# Patient Record
Sex: MALE | Race: WHITE | Employment: UNEMPLOYED | URBAN - METROPOLITAN AREA
[De-identification: names, ages, dates, MRNs, and addresses within clinical notes are randomized per-mention and may not be internally consistent; named-entity substitution may affect disease eponyms.]

---

## 2017-04-26 ENCOUNTER — HOSPITAL ENCOUNTER (EMERGENCY)
Age: 42
Discharge: HOME | End: 2017-04-26
Attending: EMERGENCY MEDICINE
Payer: MEDICAID

## 2017-04-26 ENCOUNTER — APPOINTMENT (OUTPATIENT)
Dept: CT IMAGING | Age: 42
End: 2017-04-26
Payer: MEDICAID

## 2017-04-26 ENCOUNTER — APPOINTMENT (OUTPATIENT)
Dept: GENERAL RADIOLOGY | Age: 42
End: 2017-04-26
Payer: MEDICAID

## 2017-04-26 VITALS
HEART RATE: 63 BPM | HEIGHT: 69 IN | SYSTOLIC BLOOD PRESSURE: 142 MMHG | TEMPERATURE: 98.2 F | OXYGEN SATURATION: 98 % | RESPIRATION RATE: 18 BRPM | DIASTOLIC BLOOD PRESSURE: 95 MMHG | BODY MASS INDEX: 28.14 KG/M2 | WEIGHT: 190 LBS

## 2017-04-26 DIAGNOSIS — S09.90XA HEAD INJURY, INITIAL ENCOUNTER: Primary | ICD-10-CM

## 2017-04-26 DIAGNOSIS — S16.1XXA NECK STRAIN, INITIAL ENCOUNTER: ICD-10-CM

## 2017-04-26 DIAGNOSIS — S39.012A LUMBAR STRAIN, INITIAL ENCOUNTER: ICD-10-CM

## 2017-04-26 PROCEDURE — 70450 CT HEAD/BRAIN W/O DYE: CPT

## 2017-04-26 PROCEDURE — 99284 EMERGENCY DEPT VISIT MOD MDM: CPT

## 2017-04-26 PROCEDURE — 72050 X-RAY EXAM NECK SPINE 4/5VWS: CPT

## 2017-04-26 RX ORDER — HYDROCODONE BITARTRATE AND ACETAMINOPHEN 5; 325 MG/1; MG/1
1 TABLET ORAL EVERY 6 HOURS PRN
Qty: 10 TABLET | Refills: 0 | Status: SHIPPED | OUTPATIENT
Start: 2017-04-26 | End: 2017-05-03

## 2017-04-26 RX ORDER — NAPROXEN 500 MG/1
500 TABLET ORAL 2 TIMES DAILY WITH MEALS
Qty: 30 TABLET | Refills: 0 | Status: SHIPPED | OUTPATIENT
Start: 2017-04-26 | End: 2019-02-02 | Stop reason: ALTCHOICE

## 2017-04-26 RX ORDER — CYCLOBENZAPRINE HCL 10 MG
10 TABLET ORAL 3 TIMES DAILY PRN
Qty: 30 TABLET | Refills: 0 | Status: SHIPPED | OUTPATIENT
Start: 2017-04-26 | End: 2017-05-06

## 2017-04-26 ASSESSMENT — ENCOUNTER SYMPTOMS
CONSTIPATION: 0
STRIDOR: 0
WHEEZING: 0
EYE DISCHARGE: 0
SORE THROAT: 0
BACK PAIN: 1
ABDOMINAL PAIN: 0
SHORTNESS OF BREATH: 0
DIARRHEA: 0
COUGH: 0
RHINORRHEA: 0
ABDOMINAL DISTENTION: 0
CHEST TIGHTNESS: 0
VOMITING: 0
CHOKING: 0
NAUSEA: 0
COLOR CHANGE: 0

## 2017-04-26 ASSESSMENT — PAIN SCALES - GENERAL: PAINLEVEL_OUTOF10: 8

## 2017-04-26 ASSESSMENT — PAIN DESCRIPTION - LOCATION: LOCATION: BACK;HEAD;NECK

## 2019-02-02 ENCOUNTER — APPOINTMENT (OUTPATIENT)
Dept: GENERAL RADIOLOGY | Age: 44
End: 2019-02-02
Payer: COMMERCIAL

## 2019-02-02 ENCOUNTER — HOSPITAL ENCOUNTER (EMERGENCY)
Age: 44
Discharge: HOME | End: 2019-02-02
Payer: COMMERCIAL

## 2019-02-02 VITALS
OXYGEN SATURATION: 96 % | HEART RATE: 73 BPM | RESPIRATION RATE: 18 BRPM | TEMPERATURE: 97.6 F | HEIGHT: 69 IN | BODY MASS INDEX: 25.92 KG/M2 | WEIGHT: 175 LBS | DIASTOLIC BLOOD PRESSURE: 80 MMHG | SYSTOLIC BLOOD PRESSURE: 125 MMHG

## 2019-02-02 DIAGNOSIS — S40.011A CONTUSION OF RIGHT SHOULDER, INITIAL ENCOUNTER: Primary | ICD-10-CM

## 2019-02-02 PROCEDURE — 6370000000 HC RX 637 (ALT 250 FOR IP): Performed by: NURSE PRACTITIONER

## 2019-02-02 PROCEDURE — 73030 X-RAY EXAM OF SHOULDER: CPT

## 2019-02-02 PROCEDURE — 99283 EMERGENCY DEPT VISIT LOW MDM: CPT

## 2019-02-02 RX ORDER — HYDROCODONE BITARTRATE AND ACETAMINOPHEN 5; 325 MG/1; MG/1
1 TABLET ORAL ONCE
Status: COMPLETED | OUTPATIENT
Start: 2019-02-02 | End: 2019-02-02

## 2019-02-02 RX ORDER — CYCLOBENZAPRINE HCL 10 MG
10 TABLET ORAL 3 TIMES DAILY PRN
Qty: 15 TABLET | Refills: 0 | Status: SHIPPED | OUTPATIENT
Start: 2019-02-02 | End: 2019-02-12

## 2019-02-02 RX ORDER — NAPROXEN 500 MG/1
500 TABLET ORAL 2 TIMES DAILY
Qty: 20 TABLET | Refills: 0 | Status: SHIPPED | OUTPATIENT
Start: 2019-02-02 | End: 2019-07-22

## 2019-02-02 RX ADMIN — HYDROCODONE BITARTRATE AND ACETAMINOPHEN 1 TABLET: 5; 325 TABLET ORAL at 17:30

## 2019-02-02 ASSESSMENT — ENCOUNTER SYMPTOMS
ABDOMINAL PAIN: 0
SHORTNESS OF BREATH: 0
BACK PAIN: 0
COUGH: 0

## 2019-02-02 ASSESSMENT — PAIN SCALES - GENERAL
PAINLEVEL_OUTOF10: 7
PAINLEVEL_OUTOF10: 7

## 2019-02-02 ASSESSMENT — PAIN DESCRIPTION - LOCATION: LOCATION: SHOULDER

## 2019-02-02 ASSESSMENT — PAIN DESCRIPTION - ORIENTATION: ORIENTATION: RIGHT

## 2019-07-22 ENCOUNTER — HOSPITAL ENCOUNTER (EMERGENCY)
Age: 44
Discharge: HOME | End: 2019-07-22
Payer: COMMERCIAL

## 2019-07-22 ENCOUNTER — APPOINTMENT (OUTPATIENT)
Dept: GENERAL RADIOLOGY | Age: 44
End: 2019-07-22
Payer: COMMERCIAL

## 2019-07-22 VITALS
RESPIRATION RATE: 16 BRPM | OXYGEN SATURATION: 97 % | HEART RATE: 86 BPM | TEMPERATURE: 98.1 F | SYSTOLIC BLOOD PRESSURE: 120 MMHG | HEIGHT: 69 IN | DIASTOLIC BLOOD PRESSURE: 83 MMHG | WEIGHT: 180 LBS | BODY MASS INDEX: 26.66 KG/M2

## 2019-07-22 DIAGNOSIS — S93.401A SPRAIN OF RIGHT ANKLE, UNSPECIFIED LIGAMENT, INITIAL ENCOUNTER: Primary | ICD-10-CM

## 2019-07-22 PROCEDURE — 73610 X-RAY EXAM OF ANKLE: CPT

## 2019-07-22 PROCEDURE — 99283 EMERGENCY DEPT VISIT LOW MDM: CPT

## 2019-07-22 PROCEDURE — 6370000000 HC RX 637 (ALT 250 FOR IP): Performed by: PHYSICIAN ASSISTANT

## 2019-07-22 RX ORDER — IBUPROFEN 800 MG/1
800 TABLET ORAL ONCE
Status: COMPLETED | OUTPATIENT
Start: 2019-07-22 | End: 2019-07-22

## 2019-07-22 RX ORDER — IBUPROFEN 800 MG/1
800 TABLET ORAL EVERY 8 HOURS PRN
Qty: 20 TABLET | Refills: 0 | Status: SHIPPED | OUTPATIENT
Start: 2019-07-22 | End: 2020-08-03

## 2019-07-22 RX ADMIN — IBUPROFEN 800 MG: 800 TABLET, FILM COATED ORAL at 21:44

## 2019-07-22 ASSESSMENT — ENCOUNTER SYMPTOMS
BACK PAIN: 0
VOICE CHANGE: 0
ABDOMINAL DISTENTION: 0
VOMITING: 0
PHOTOPHOBIA: 0
EYE DISCHARGE: 0
ANAL BLEEDING: 0
NAUSEA: 0
APNEA: 0

## 2019-07-22 ASSESSMENT — PAIN DESCRIPTION - LOCATION: LOCATION: ANKLE

## 2019-07-22 ASSESSMENT — PAIN DESCRIPTION - ORIENTATION: ORIENTATION: RIGHT

## 2019-07-22 ASSESSMENT — PAIN SCALES - GENERAL
PAINLEVEL_OUTOF10: 10
PAINLEVEL_OUTOF10: 10

## 2019-07-22 ASSESSMENT — PAIN DESCRIPTION - PAIN TYPE: TYPE: ACUTE PAIN

## 2019-07-22 ASSESSMENT — PAIN DESCRIPTION - FREQUENCY: FREQUENCY: CONTINUOUS

## 2019-07-22 ASSESSMENT — PAIN DESCRIPTION - DESCRIPTORS: DESCRIPTORS: THROBBING

## 2019-07-23 NOTE — ED PROVIDER NOTES
120/83   Pulse: 86   Resp: 16   Temp: 98.1 °F (36.7 °C)   TempSrc: Oral   SpO2: 97%   Weight: 180 lb (81.6 kg)   Height: 5' 9\" (1.753 m)            MDM  Number of Diagnoses or Management Options  Sprain of right ankle, unspecified ligament, initial encounter:   Diagnosis management comments: Patient remain nonweightbearing follow-up with orthopedics tomorrow. Return to if any symptoms worsen or new symptoms develop. Amount and/or Complexity of Data Reviewed  Tests in the radiology section of CPT®: reviewed and ordered        CRITICAL CARE TIME   Total Critical Care time was   minutes, excluding separately reportableprocedures. There was a high probability of clinicallysignificant/life threatening deterioration in the patient's condition which required my urgent intervention. CONSULTS:  None    PROCEDURES:  Unless otherwise noted below, none     Procedures    FINAL IMPRESSION      1.  Sprain of right ankle, unspecified ligament, initial encounter          DISPOSITION/PLAN   DISPOSITION Decision To Discharge 07/22/2019 09:34:32 PM      PATIENT REFERRED TO:  Matt Kimball MD  [address] [place] [address] [facility]  [phone]    Schedule an appointment as soon as possible for a visit in 1 day      [facility]) ED  [address] [facility]  [phone]    If symptoms worsen      DISCHARGE MEDICATIONS:  New Prescriptions    IBUPROFEN (IBU) 800 MG TABLET    Take 1 tablet by mouth every 8 hours as needed for Pain          (Please note that portions of this note were completed with a voice recognition program.  Efforts were made to edit the dictations but occasionally words are mis-transcribed.)    Sara Groves PA-C (electronically signed)  Attending Emergency Physician       Sara Groves PA-C  07/22/19 2138       Sara Groves PA-C  09/11/19 [address] Elsa Rodriguez PA-C  09/25/19 [id number] [facility]  09/30/19 [id number]

## 2019-09-11 ASSESSMENT — ENCOUNTER SYMPTOMS
ABDOMINAL DISTENTION: 0
NAUSEA: 0
VOMITING: 0
VOICE CHANGE: 0
ANAL BLEEDING: 0
APNEA: 0
PHOTOPHOBIA: 0
EYE DISCHARGE: 0
BACK PAIN: 0

## 2020-08-03 ENCOUNTER — HOSPITAL ENCOUNTER (INPATIENT)
Age: 45
LOS: 2 days | Discharge: HOME | DRG: 316 | End: 2020-08-05
Attending: EMERGENCY MEDICINE | Admitting: ORTHOPAEDIC SURGERY
Payer: COMMERCIAL

## 2020-08-03 ENCOUNTER — APPOINTMENT (OUTPATIENT)
Dept: GENERAL RADIOLOGY | Age: 45
DRG: 316 | End: 2020-08-03
Payer: COMMERCIAL

## 2020-08-03 PROBLEM — M65.9 TENOSYNOVITIS OF FINGER: Status: ACTIVE | Noted: 2020-08-03

## 2020-08-03 LAB
ALBUMIN SERPL-MCNC: 4.1 G/DL (ref 3.5–4.6)
ALP BLD-CCNC: 76 U/L (ref 35–104)
ALT SERPL-CCNC: 20 U/L (ref 0–41)
ANION GAP SERPL CALCULATED.3IONS-SCNC: 14 MEQ/L (ref 9–15)
APTT: 29.5 SEC (ref 24.4–36.8)
AST SERPL-CCNC: 18 U/L (ref 0–40)
BASOPHILS ABSOLUTE: 0.1 K/UL (ref 0–0.2)
BASOPHILS RELATIVE PERCENT: 0.5 %
BILIRUB SERPL-MCNC: 0.7 MG/DL (ref 0.2–0.7)
BUN BLDV-MCNC: 7 MG/DL (ref 6–20)
CALCIUM SERPL-MCNC: 9 MG/DL (ref 8.5–9.9)
CHLORIDE BLD-SCNC: 101 MEQ/L (ref 95–107)
CO2: 25 MEQ/L (ref 20–31)
CREAT SERPL-MCNC: 0.82 MG/DL (ref 0.7–1.2)
EOSINOPHILS ABSOLUTE: 0.2 K/UL (ref 0–0.7)
EOSINOPHILS RELATIVE PERCENT: 1.3 %
GFR AFRICAN AMERICAN: >60
GFR NON-AFRICAN AMERICAN: >60
GLOBULIN: 2.9 G/DL (ref 2.3–3.5)
GLUCOSE BLD-MCNC: 119 MG/DL (ref 70–99)
HCT VFR BLD CALC: 48.2 % (ref 42–52)
HEMOGLOBIN: 16.3 G/DL (ref 14–18)
INR BLD: 1
LYMPHOCYTES ABSOLUTE: 1.2 K/UL (ref 1–4.8)
LYMPHOCYTES RELATIVE PERCENT: 9.9 %
MCH RBC QN AUTO: 32.3 PG (ref 27–31.3)
MCHC RBC AUTO-ENTMCNC: 33.8 % (ref 33–37)
MCV RBC AUTO: 95.7 FL (ref 80–100)
MONOCYTES ABSOLUTE: 0.9 K/UL (ref 0.2–0.8)
MONOCYTES RELATIVE PERCENT: 8.1 %
NEUTROPHILS ABSOLUTE: 9.4 K/UL (ref 1.4–6.5)
NEUTROPHILS RELATIVE PERCENT: 80.2 %
PDW BLD-RTO: 14 % (ref 11.5–14.5)
PLATELET # BLD: 215 K/UL (ref 130–400)
POTASSIUM SERPL-SCNC: 3.3 MEQ/L (ref 3.4–4.9)
PROTHROMBIN TIME: 12.9 SEC (ref 12.3–14.9)
RBC # BLD: 5.04 M/UL (ref 4.7–6.1)
SODIUM BLD-SCNC: 140 MEQ/L (ref 135–144)
TOTAL PROTEIN: 7 G/DL (ref 6.3–8)
WBC # BLD: 11.7 K/UL (ref 4.8–10.8)

## 2020-08-03 PROCEDURE — 6370000000 HC RX 637 (ALT 250 FOR IP): Performed by: NURSE PRACTITIONER

## 2020-08-03 PROCEDURE — 85025 COMPLETE CBC W/AUTO DIFF WBC: CPT

## 2020-08-03 PROCEDURE — 80053 COMPREHEN METABOLIC PANEL: CPT

## 2020-08-03 PROCEDURE — 73130 X-RAY EXAM OF HAND: CPT

## 2020-08-03 PROCEDURE — 87075 CULTR BACTERIA EXCEPT BLOOD: CPT

## 2020-08-03 PROCEDURE — 87040 BLOOD CULTURE FOR BACTERIA: CPT

## 2020-08-03 PROCEDURE — 2580000003 HC RX 258: Performed by: INTERNAL MEDICINE

## 2020-08-03 PROCEDURE — 6360000002 HC RX W HCPCS: Performed by: INTERNAL MEDICINE

## 2020-08-03 PROCEDURE — 87205 SMEAR GRAM STAIN: CPT

## 2020-08-03 PROCEDURE — 87186 SC STD MICRODIL/AGAR DIL: CPT

## 2020-08-03 PROCEDURE — 6360000002 HC RX W HCPCS: Performed by: EMERGENCY MEDICINE

## 2020-08-03 PROCEDURE — 2580000003 HC RX 258: Performed by: EMERGENCY MEDICINE

## 2020-08-03 PROCEDURE — 6370000000 HC RX 637 (ALT 250 FOR IP): Performed by: EMERGENCY MEDICINE

## 2020-08-03 PROCEDURE — 96375 TX/PRO/DX INJ NEW DRUG ADDON: CPT

## 2020-08-03 PROCEDURE — 87147 CULTURE TYPE IMMUNOLOGIC: CPT

## 2020-08-03 PROCEDURE — 6370000000 HC RX 637 (ALT 250 FOR IP): Performed by: INTERNAL MEDICINE

## 2020-08-03 PROCEDURE — 96365 THER/PROPH/DIAG IV INF INIT: CPT

## 2020-08-03 PROCEDURE — 6360000002 HC RX W HCPCS: Performed by: NURSE PRACTITIONER

## 2020-08-03 PROCEDURE — 36415 COLL VENOUS BLD VENIPUNCTURE: CPT

## 2020-08-03 PROCEDURE — 1210000000 HC MED SURG R&B

## 2020-08-03 PROCEDURE — 99284 EMERGENCY DEPT VISIT MOD MDM: CPT

## 2020-08-03 PROCEDURE — 87591 N.GONORRHOEAE DNA AMP PROB: CPT

## 2020-08-03 PROCEDURE — 85610 PROTHROMBIN TIME: CPT

## 2020-08-03 PROCEDURE — 87070 CULTURE OTHR SPECIMN AEROBIC: CPT

## 2020-08-03 PROCEDURE — 87491 CHLMYD TRACH DNA AMP PROBE: CPT

## 2020-08-03 PROCEDURE — 87077 CULTURE AEROBIC IDENTIFY: CPT

## 2020-08-03 PROCEDURE — 2580000003 HC RX 258: Performed by: NURSE PRACTITIONER

## 2020-08-03 PROCEDURE — 99254 IP/OBS CNSLTJ NEW/EST MOD 60: CPT | Performed by: INTERNAL MEDICINE

## 2020-08-03 PROCEDURE — 85730 THROMBOPLASTIN TIME PARTIAL: CPT

## 2020-08-03 RX ORDER — THIAMINE MONONITRATE (VIT B1) 100 MG
100 TABLET ORAL DAILY
Status: DISCONTINUED | OUTPATIENT
Start: 2020-08-03 | End: 2020-08-05 | Stop reason: HOSPADM

## 2020-08-03 RX ORDER — MORPHINE SULFATE 2 MG/ML
2 INJECTION, SOLUTION INTRAMUSCULAR; INTRAVENOUS
Status: DISCONTINUED | OUTPATIENT
Start: 2020-08-03 | End: 2020-08-05 | Stop reason: HOSPADM

## 2020-08-03 RX ORDER — AZITHROMYCIN 500 MG/1
1000 TABLET, FILM COATED ORAL ONCE
Status: COMPLETED | OUTPATIENT
Start: 2020-08-03 | End: 2020-08-03

## 2020-08-03 RX ORDER — FOLIC ACID 1 MG/1
1 TABLET ORAL DAILY
Status: DISCONTINUED | OUTPATIENT
Start: 2020-08-03 | End: 2020-08-05 | Stop reason: HOSPADM

## 2020-08-03 RX ORDER — SODIUM CHLORIDE 0.9 % (FLUSH) 0.9 %
10 SYRINGE (ML) INJECTION EVERY 12 HOURS SCHEDULED
Status: DISCONTINUED | OUTPATIENT
Start: 2020-08-03 | End: 2020-08-04 | Stop reason: HOSPADM

## 2020-08-03 RX ORDER — SODIUM CHLORIDE 0.9 % (FLUSH) 0.9 %
10 SYRINGE (ML) INJECTION PRN
Status: DISCONTINUED | OUTPATIENT
Start: 2020-08-03 | End: 2020-08-04 | Stop reason: HOSPADM

## 2020-08-03 RX ORDER — ACETAMINOPHEN 500 MG
1000 TABLET ORAL ONCE
Status: DISCONTINUED | OUTPATIENT
Start: 2020-08-03 | End: 2020-08-04 | Stop reason: HOSPADM

## 2020-08-03 RX ORDER — SODIUM CHLORIDE, SODIUM LACTATE, POTASSIUM CHLORIDE, CALCIUM CHLORIDE 600; 310; 30; 20 MG/100ML; MG/100ML; MG/100ML; MG/100ML
INJECTION, SOLUTION INTRAVENOUS CONTINUOUS
Status: DISCONTINUED | OUTPATIENT
Start: 2020-08-03 | End: 2020-08-04

## 2020-08-03 RX ORDER — PROMETHAZINE HYDROCHLORIDE 12.5 MG/1
12.5 TABLET ORAL EVERY 6 HOURS PRN
Status: DISCONTINUED | OUTPATIENT
Start: 2020-08-03 | End: 2020-08-05 | Stop reason: HOSPADM

## 2020-08-03 RX ORDER — ONDANSETRON 2 MG/ML
4 INJECTION INTRAMUSCULAR; INTRAVENOUS ONCE
Status: COMPLETED | OUTPATIENT
Start: 2020-08-03 | End: 2020-08-03

## 2020-08-03 RX ORDER — OXYCODONE HYDROCHLORIDE 5 MG/1
5 TABLET ORAL EVERY 6 HOURS PRN
Status: DISCONTINUED | OUTPATIENT
Start: 2020-08-03 | End: 2020-08-05 | Stop reason: HOSPADM

## 2020-08-03 RX ORDER — ACETAMINOPHEN 500 MG
1000 TABLET ORAL ONCE
Status: COMPLETED | OUTPATIENT
Start: 2020-08-03 | End: 2020-08-04

## 2020-08-03 RX ORDER — MORPHINE SULFATE 2 MG/ML
4 INJECTION, SOLUTION INTRAMUSCULAR; INTRAVENOUS ONCE
Status: COMPLETED | OUTPATIENT
Start: 2020-08-03 | End: 2020-08-03

## 2020-08-03 RX ORDER — ONDANSETRON 2 MG/ML
4 INJECTION INTRAMUSCULAR; INTRAVENOUS EVERY 6 HOURS PRN
Status: DISCONTINUED | OUTPATIENT
Start: 2020-08-03 | End: 2020-08-05 | Stop reason: HOSPADM

## 2020-08-03 RX ADMIN — PIPERACILLIN AND TAZOBACTAM 3.38 G: 3; .375 INJECTION, POWDER, LYOPHILIZED, FOR SOLUTION INTRAVENOUS at 18:30

## 2020-08-03 RX ADMIN — MORPHINE SULFATE 2 MG: 2 INJECTION, SOLUTION INTRAMUSCULAR; INTRAVENOUS at 21:51

## 2020-08-03 RX ADMIN — MORPHINE SULFATE 4 MG: 2 INJECTION, SOLUTION INTRAMUSCULAR; INTRAVENOUS at 13:32

## 2020-08-03 RX ADMIN — PIPERACILLIN AND TAZOBACTAM 3.38 G: 3; .375 INJECTION, POWDER, LYOPHILIZED, FOR SOLUTION INTRAVENOUS at 11:48

## 2020-08-03 RX ADMIN — ONDANSETRON 4 MG: 2 INJECTION INTRAMUSCULAR; INTRAVENOUS at 13:29

## 2020-08-03 RX ADMIN — MORPHINE SULFATE 2 MG: 2 INJECTION, SOLUTION INTRAMUSCULAR; INTRAVENOUS at 19:33

## 2020-08-03 RX ADMIN — MORPHINE SULFATE 2 MG: 2 INJECTION, SOLUTION INTRAMUSCULAR; INTRAVENOUS at 17:17

## 2020-08-03 RX ADMIN — OXYCODONE 5 MG: 5 TABLET ORAL at 15:50

## 2020-08-03 RX ADMIN — VANCOMYCIN HYDROCHLORIDE 1250 MG: 5 INJECTION, POWDER, LYOPHILIZED, FOR SOLUTION INTRAVENOUS at 20:54

## 2020-08-03 RX ADMIN — FOLIC ACID 1 MG: 1 TABLET ORAL at 19:33

## 2020-08-03 RX ADMIN — Medication 100 MG: at 19:33

## 2020-08-03 RX ADMIN — Medication 10 ML: at 20:54

## 2020-08-03 RX ADMIN — AZITHROMYCIN MONOHYDRATE 1000 MG: 500 TABLET ORAL at 11:35

## 2020-08-03 ASSESSMENT — ENCOUNTER SYMPTOMS
SHORTNESS OF BREATH: 0
EYE PAIN: 0
SORE THROAT: 0
VOMITING: 0
COLOR CHANGE: 1
ABDOMINAL PAIN: 0
CHEST TIGHTNESS: 0
NAUSEA: 0
RESPIRATORY NEGATIVE: 1
EYES NEGATIVE: 1

## 2020-08-03 ASSESSMENT — PAIN DESCRIPTION - PAIN TYPE
TYPE: ACUTE PAIN
TYPE: ACUTE PAIN

## 2020-08-03 ASSESSMENT — PAIN SCALES - GENERAL
PAINLEVEL_OUTOF10: 6
PAINLEVEL_OUTOF10: 5
PAINLEVEL_OUTOF10: 7
PAINLEVEL_OUTOF10: 7
PAINLEVEL_OUTOF10: 6
PAINLEVEL_OUTOF10: 7

## 2020-08-03 ASSESSMENT — PAIN DESCRIPTION - ORIENTATION
ORIENTATION: LEFT
ORIENTATION: LEFT

## 2020-08-03 ASSESSMENT — PAIN DESCRIPTION - DESCRIPTORS
DESCRIPTORS: THROBBING
DESCRIPTORS: THROBBING

## 2020-08-03 ASSESSMENT — PAIN DESCRIPTION - LOCATION
LOCATION: FINGER (COMMENT WHICH ONE)
LOCATION: FINGER (COMMENT WHICH ONE)

## 2020-08-03 NOTE — H&P
Inpatient Consultation      Edwin Tobin (1975)  8/3/2020    Reason for Consult: Left fourth finger infection  IMPRESSION/RECOMMENDATIONS:    Assessment: Left fourth finger infection    Plan:  1) plan for surgical irrigation and debridement of left fourth finger infection  2) risks, benefits and alternatives of surgery were discussed including but not limited to infection, bleeding, neurovascular injury, persistent pain dysfunction and long-term disability. Cardiovascular pulmonary complications from anesthesia including death and DVT. Patient accepts these risks. We discussed specifically wound healing complications including recurrence of infection, deep osteomyelitis, foreign body, stiffness and permanent dysfunction as well as the possible the need for additional surgeries. Patient agrees to plan  3) IV antibiotics per infectious disease    Janki Amato    CHIEF COMPLAINT: Left fourth finger infection    HISTORY OF PRESENT ILLNESS:                The patient is a 53 y.o. male who presents with above chief complaint. Who presents with a infected left fourth finger after suspected splinter approximately 3 days ago. He had increasing amount of redness erythema and swelling. No other specific concerns.   Patient had attempted self removal.    Past Medical History:        Diagnosis Date    Cancer [facility])     history of testicular      Past Surgical History:        Procedure Laterality Date    BACK SURGERY      KNEE SURGERY      SKULL FRACTURE ELEVATION       Current Medications:   Current Facility-Administered Medications: lactated ringers infusion, , Intravenous, Continuous  sodium chloride flush 0.9 % injection 10 mL, 10 mL, Intravenous, 2 times per day  sodium chloride flush 0.9 % injection 10 mL, 10 mL, Intravenous, PRN  acetaminophen (TYLENOL) tablet 1,000 mg, 1,000 mg, Oral, Once  piperacillin-tazobactam (ZOSYN) 3.375 g in dextrose 5 % 50 mL IVPB (mini-bag), 3.375 g, Intravenous, Q6H  sodium chloride flush 0.9 % injection 10 mL, 10 mL, Intravenous, 2 times per day  sodium chloride flush 0.9 % injection 10 mL, 10 mL, Intravenous, PRN  acetaminophen (TYLENOL) tablet 1,000 mg, 1,000 mg, Oral, Once  oxyCODONE (ROXICODONE) immediate release tablet 5 mg, 5 mg, Oral, Q6H PRN  morphine (PF) injection 2 mg, 2 mg, Intravenous, Q2H PRN  magnesium hydroxide (MILK OF MAGNESIA) 400 MG/5ML suspension 30 mL, 30 mL, Oral, Daily PRN  promethazine (PHENERGAN) tablet 12.5 mg, 12.5 mg, Oral, Q6H PRN **OR** ondansetron (ZOFRAN) injection 4 mg, 4 mg, Intravenous, Q6H PRN  Allergies:  Patient has no known allergies. Social History:   TOBACCO:   reports that he has been smoking. He has a 20.00 pack-year smoking history. He has never used smokeless tobacco.  ETOH:   reports current alcohol use. DRUGS:   reports no history of drug use. Family History:   History reviewed. No pertinent family history. Review of Systems:  Reviewed from initial History, Physical exam and Assessment of Emergency Room Physician. I agree with their findings. Physical Exam:    Ortho Exam   Constitutional: well nourished, well developed, appears stated age. Alert and oriented x3. Responds appropriately to questions and commands  Head: Atraumatic  ENT: trachea midline  Eye: EOMI  Lung: Respiration regular, no audible wheeze  Abdomen: Soft, non tender  Cardiovascular: pulse regular to extremity  Extremity: Neurovascularly intact, palpable pulse, warm and well perfused. Skin intact and sensation intact to light touch  Focused Orthopedic Exam:  Left upper extremity: Neurovascular intact. Palpable radial pulse. Warm and well-perfused, sensation tach to light touch. Patient is moderate swelling and  Erythema along the middle phalanx of the fourth digit. His swelling and redness is probably dorsal.  No pain along the flexor tendons.     DATA:    CBC:   Lab Results   Component Value Date    WBC 11.7 08/03/2020    RBC 5.04 08/03/2020 HGB 16.3 08/03/2020    HCT 48.2 08/03/2020    MCV 95.7 08/03/2020    MCH 32.3 08/03/2020    MCHC 33.8 08/03/2020    RDW 14.0 08/03/2020     08/03/2020     CBC:    Lab Results   Component Value Date    WBC 11.7 08/03/2020    RBC 5.04 08/03/2020    HGB 16.3 08/03/2020    HCT 48.2 08/03/2020     08/03/2020     BMP:    Lab Results   Component Value Date     08/03/2020    K 3.3 08/03/2020     08/03/2020    CO2 25 08/03/2020    BUN 7 08/03/2020    CREATININE 0.82 08/03/2020    CALCIUM 9.0 08/03/2020    GFRAA >60.0 08/03/2020    LABGLOM >60.0 08/03/2020    GLUCOSE 119 08/03/2020     Blood Culture:  No components found for: [address], [address] [facility]      Radiology: No acute fracture dislocations. Possible foreign body identified.

## 2020-08-03 NOTE — ED NOTES
Pt given surgical scrub sponge for cleaning hands again     Jerome Baig.  Jessica Ornelas  08/03/20 [id number]

## 2020-08-03 NOTE — PROGRESS NOTES
Patient's vitals are stable. Hands washed, splint placed. 7/10 pain, Roxicodone did not help, morphine given and is helping. Consent signed & in the chart. Call light in reach.

## 2020-08-03 NOTE — ED PROVIDER NOTES
[facility] ED  EMERGENCY DEPARTMENT ENCOUNTER      Pt Name: Maximo Martinez  MRN: [id number]  [place] 1975  Date of evaluation: 8/3/2020  Provider: Vincent Dinh, [address] [place] [address]       Chief Complaint   Patient presents with    Wound Infection     ring finger left hand, states that there are splinters stuck in finger         HISTORY OF PRESENT ILLNESS   (Location/Symptom, Timing/Onset, Context/Setting, Quality, Duration, Modifying Factors, Severity)  Note limiting factors. Maximo Martinez is a 53 y.o. male who presents to the emergency department . Patient presents with swollen left finger. He had splinters in his finger and tried to get them out. He believes that some are still in. Now his left ring finger is very swollen and the redness and swelling is spreading up towards his hand. Finger is painful. Tetanus within 5 years. Additionally, patient believes he has an STD. States that he has purulent discharge from his penis. No genital sores. HPI    Nursing Notes were reviewed. REVIEW OF SYSTEMS    (2-9 systems for level 4, 10 or more for level 5)     Review of Systems   Constitutional: Negative for activity change, appetite change and fatigue. HENT: Negative for congestion and sore throat. Eyes: Negative for pain and visual disturbance. Respiratory: Negative for chest tightness and shortness of breath. Cardiovascular: Negative for chest pain. Gastrointestinal: Negative for abdominal pain, nausea and vomiting. Endocrine: Negative for polydipsia. Genitourinary: Negative for flank pain and urgency. Musculoskeletal: Positive for joint swelling. Negative for gait problem and neck stiffness. Skin: Positive for color change and wound. Negative for rash. Neurological: Negative for weakness, light-headedness and headaches. Psychiatric/Behavioral: Negative for confusion and sleep disturbance.        Except as noted above the remainder of the review of systems was reviewed and negative. PAST MEDICAL HISTORY   History reviewed. No pertinent past medical history. SURGICAL HISTORY       Past Surgical History:   Procedure Laterality Date    BACK SURGERY           CURRENT MEDICATIONS       Previous Medications    IBUPROFEN (IBU) 800 MG TABLET    Take 1 tablet by mouth every 8 hours as needed for Pain       ALLERGIES     Patient has no known allergies. FAMILY HISTORY     History reviewed. No pertinent family history.        SOCIAL HISTORY       Social History     Socioeconomic History    Marital status:      Spouse name: None    Number of children: None    Years of education: None    Highest education level: None   Occupational History    None   Social Needs    Financial resource strain: None    Food insecurity     Worry: None     Inability: None    Transportation needs     Medical: None     Non-medical: None   Tobacco Use    Smoking status: Current Every Day Smoker     Packs/day: 1.00     Years: 20.00     Pack years: 20.00    Smokeless tobacco: Never Used   Substance and Sexual Activity    Alcohol use: No    Drug use: No    Sexual activity: None   Lifestyle    Physical activity     Days per week: None     Minutes per session: None    Stress: None   Relationships    Social connections     Talks on phone: None     Gets together: None     Attends [employer] service: None     Active member of club or organization: None     Attends meetings of clubs or organizations: None     Relationship status: None    Intimate partner violence     Fear of current or ex partner: None     Emotionally abused: None     Physically abused: None     Forced sexual activity: None   Other Topics Concern    None   Social History Narrative    None       SCREENINGS                        PHYSICAL EXAM    (up to 7 for level 4, 8 or more for level 5)     ED Triage Vitals [08/03/20 1014]   BP Temp Temp Source Pulse Resp SpO2 Height Weight   (!) 143/88 98.3 °F (36.8 °C) Oral 83 16 95 % 5' 9\" (1.753 m) 180 lb (81.6 kg)       Physical Exam  Constitutional:       General: He is not in acute distress. Appearance: He is well-developed. He is not diaphoretic. HENT:      Head: Normocephalic and atraumatic. Right Ear: External ear normal.      Left Ear: External ear normal.      Mouth/Throat:      Pharynx: No oropharyngeal exudate. Eyes:      Conjunctiva/sclera: Conjunctivae normal.      Pupils: Pupils are equal, round, and reactive to light. Neck:      Musculoskeletal: Normal range of motion and neck supple. Thyroid: No thyromegaly. Vascular: No JVD. Trachea: No tracheal deviation. Cardiovascular:      Rate and Rhythm: Normal rate. Heart sounds: Normal heart sounds. No murmur. Pulmonary:      Effort: Pulmonary effort is normal. No respiratory distress. Breath sounds: Normal breath sounds. No wheezing. Abdominal:      General: Bowel sounds are normal.      Palpations: Abdomen is soft. Tenderness: There is no abdominal tenderness. There is no guarding. Musculoskeletal:         General: Tenderness present. Comments: Left fourth digit extremely swollen and red. Lateral aspect of the finger has some blisters when unroofed had blood-tinged purulent matter in them. Possible splinter noted on the dorsum of that finger. Finger is fusiform and he cannot flex it     Skin:     General: Skin is warm and dry. Findings: No rash. Neurological:      Mental Status: He is alert and oriented to person, place, and time. Cranial Nerves: No cranial nerve deficit.    Psychiatric:         Behavior: Behavior normal.         DIAGNOSTIC RESULTS     EKG: All EKG's are interpreted by the Emergency Department Physician who either signs or Co-signs this chart in the absence of a cardiologist.        RADIOLOGY:   Non-plain film images such as CT, Ultrasound and MRI are read by the radiologist. Plain radiographic images are visualized and occasionally words are mis-transcribed.)    Job Harrington DO (electronically signed)  Attending Emergency Physician            Job Harrington DO  08/03/20 2101       Job Harrington DO  08/03/20 2101

## 2020-08-03 NOTE — CONSULTS
deviation present. No thyromegaly present. Cardiovascular: Normal heart sounds. No murmur heard. Pulmonary/Chest: Breath sounds normal. No respiratory distress. He has no wheezes. He has no rales. He exhibits no tenderness. Abdominal: Soft. Bowel sounds are normal. He exhibits no distension and no mass. There is no abdominal tenderness. There is no rebound and no guarding. Musculoskeletal:         General: Tenderness and edema present. Hands:    Lymphadenopathy:     He has no cervical adenopathy. He has no axillary adenopathy. Right: No inguinal, no supraclavicular and no epitrochlear adenopathy present. Left: No inguinal, no supraclavicular and no epitrochlear adenopathy present. Neurological: He is alert. Skin: Skin is warm. He is not diaphoretic. There is erythema. Blood pressure (!) 146/99, pulse 77, temperature 98.3 °F (36.8 °C), temperature source Oral, resp. rate 18, height 5' 9\" (1.753 m), weight 180 lb (81.6 kg), SpO2 97 %.       .   Lab Results   Component Value Date    WBC 11.7 (H) 08/03/2020    HGB 16.3 08/03/2020    HCT 48.2 08/03/2020    MCV 95.7 08/03/2020     08/03/2020     Lab Results   Component Value Date     08/03/2020    K 3.3 08/03/2020     08/03/2020    CO2 25 08/03/2020    BUN 7 08/03/2020    CREATININE 0.82 08/03/2020    GLUCOSE 119 08/03/2020    CALCIUM 9.0 08/03/2020                ASSESSMENT:  Patient Active Problem List   Diagnosis    Tenosynovitis of finger         PLAN:    Tenosynovitis left fourth finger    For incision and drainage     Continue vancomycin and Zosyn until cultures obtained

## 2020-08-03 NOTE — ED TRIAGE NOTES
Patient presents to the ER with a swollen left ring finger due to splinters being stuck in his finger. Swelling and pain has progressively gotten worse over the past 3 days. Redness to the finger and swelling noted. Minimal ROM with finger. Throbbing pain. Afebrile.

## 2020-08-04 ENCOUNTER — ANESTHESIA (OUTPATIENT)
Dept: OPERATING ROOM | Age: 45
DRG: 316 | End: 2020-08-04
Payer: COMMERCIAL

## 2020-08-04 ENCOUNTER — ANESTHESIA EVENT (OUTPATIENT)
Dept: OPERATING ROOM | Age: 45
DRG: 316 | End: 2020-08-04
Payer: COMMERCIAL

## 2020-08-04 VITALS — DIASTOLIC BLOOD PRESSURE: 59 MMHG | OXYGEN SATURATION: 100 % | SYSTOLIC BLOOD PRESSURE: 100 MMHG

## 2020-08-04 PROCEDURE — 99232 SBSQ HOSP IP/OBS MODERATE 35: CPT | Performed by: INTERNAL MEDICINE

## 2020-08-04 PROCEDURE — 2580000003 HC RX 258: Performed by: INTERNAL MEDICINE

## 2020-08-04 PROCEDURE — 87205 SMEAR GRAM STAIN: CPT

## 2020-08-04 PROCEDURE — 87147 CULTURE TYPE IMMUNOLOGIC: CPT

## 2020-08-04 PROCEDURE — 7100000001 HC PACU RECOVERY - ADDTL 15 MIN: Performed by: ORTHOPAEDIC SURGERY

## 2020-08-04 PROCEDURE — 87070 CULTURE OTHR SPECIMN AEROBIC: CPT

## 2020-08-04 PROCEDURE — 87186 SC STD MICRODIL/AGAR DIL: CPT

## 2020-08-04 PROCEDURE — 2709999900 HC NON-CHARGEABLE SUPPLY: Performed by: ORTHOPAEDIC SURGERY

## 2020-08-04 PROCEDURE — 87075 CULTR BACTERIA EXCEPT BLOOD: CPT

## 2020-08-04 PROCEDURE — 6370000000 HC RX 637 (ALT 250 FOR IP): Performed by: NURSE PRACTITIONER

## 2020-08-04 PROCEDURE — 97161 PT EVAL LOW COMPLEX 20 MIN: CPT

## 2020-08-04 PROCEDURE — 6360000002 HC RX W HCPCS: Performed by: EMERGENCY MEDICINE

## 2020-08-04 PROCEDURE — 2580000003 HC RX 258: Performed by: ORTHOPAEDIC SURGERY

## 2020-08-04 PROCEDURE — 1210000000 HC MED SURG R&B

## 2020-08-04 PROCEDURE — 2580000003 HC RX 258: Performed by: EMERGENCY MEDICINE

## 2020-08-04 PROCEDURE — 3600000012 HC SURGERY LEVEL 2 ADDTL 15MIN: Performed by: ORTHOPAEDIC SURGERY

## 2020-08-04 PROCEDURE — 6360000002 HC RX W HCPCS: Performed by: ORTHOPAEDIC SURGERY

## 2020-08-04 PROCEDURE — 0JBK0ZZ EXCISION OF LEFT HAND SUBCUTANEOUS TISSUE AND FASCIA, OPEN APPROACH: ICD-10-PCS | Performed by: ORTHOPAEDIC SURGERY

## 2020-08-04 PROCEDURE — 3600000002 HC SURGERY LEVEL 2 BASE: Performed by: ORTHOPAEDIC SURGERY

## 2020-08-04 PROCEDURE — 3700000000 HC ANESTHESIA ATTENDED CARE: Performed by: ORTHOPAEDIC SURGERY

## 2020-08-04 PROCEDURE — 3700000001 HC ADD 15 MINUTES (ANESTHESIA): Performed by: ORTHOPAEDIC SURGERY

## 2020-08-04 PROCEDURE — 2580000003 HC RX 258: Performed by: NURSE ANESTHETIST, CERTIFIED REGISTERED

## 2020-08-04 PROCEDURE — 6360000002 HC RX W HCPCS: Performed by: ANESTHESIOLOGY

## 2020-08-04 PROCEDURE — 87077 CULTURE AEROBIC IDENTIFY: CPT

## 2020-08-04 PROCEDURE — 7100000000 HC PACU RECOVERY - FIRST 15 MIN: Performed by: ORTHOPAEDIC SURGERY

## 2020-08-04 PROCEDURE — 6360000002 HC RX W HCPCS: Performed by: NURSE PRACTITIONER

## 2020-08-04 PROCEDURE — 2500000003 HC RX 250 WO HCPCS: Performed by: ORTHOPAEDIC SURGERY

## 2020-08-04 PROCEDURE — 6360000002 HC RX W HCPCS: Performed by: NURSE ANESTHETIST, CERTIFIED REGISTERED

## 2020-08-04 PROCEDURE — 6360000002 HC RX W HCPCS: Performed by: INTERNAL MEDICINE

## 2020-08-04 RX ORDER — PROPOFOL 10 MG/ML
INJECTION, EMULSION INTRAVENOUS PRN
Status: DISCONTINUED | OUTPATIENT
Start: 2020-08-04 | End: 2020-08-04 | Stop reason: SDUPTHER

## 2020-08-04 RX ORDER — BUPIVACAINE HYDROCHLORIDE 5 MG/ML
INJECTION, SOLUTION EPIDURAL; INTRACAUDAL PRN
Status: DISCONTINUED | OUTPATIENT
Start: 2020-08-04 | End: 2020-08-04 | Stop reason: ALTCHOICE

## 2020-08-04 RX ORDER — DEXAMETHASONE SODIUM PHOSPHATE 4 MG/ML
INJECTION, SOLUTION INTRA-ARTICULAR; INTRALESIONAL; INTRAMUSCULAR; INTRAVENOUS; SOFT TISSUE PRN
Status: DISCONTINUED | OUTPATIENT
Start: 2020-08-04 | End: 2020-08-04 | Stop reason: SDUPTHER

## 2020-08-04 RX ORDER — ONDANSETRON 2 MG/ML
INJECTION INTRAMUSCULAR; INTRAVENOUS PRN
Status: DISCONTINUED | OUTPATIENT
Start: 2020-08-04 | End: 2020-08-04 | Stop reason: SDUPTHER

## 2020-08-04 RX ORDER — PROMETHAZINE HYDROCHLORIDE 12.5 MG/1
12.5 TABLET ORAL EVERY 6 HOURS PRN
Status: DISCONTINUED | OUTPATIENT
Start: 2020-08-04 | End: 2020-08-05 | Stop reason: HOSPADM

## 2020-08-04 RX ORDER — MORPHINE SULFATE 4 MG/ML
4 INJECTION, SOLUTION INTRAMUSCULAR; INTRAVENOUS
Status: DISCONTINUED | OUTPATIENT
Start: 2020-08-04 | End: 2020-08-05 | Stop reason: HOSPADM

## 2020-08-04 RX ORDER — OXYCODONE HYDROCHLORIDE 5 MG/1
5 TABLET ORAL EVERY 4 HOURS PRN
Status: DISCONTINUED | OUTPATIENT
Start: 2020-08-04 | End: 2020-08-05

## 2020-08-04 RX ORDER — ONDANSETRON 2 MG/ML
4 INJECTION INTRAMUSCULAR; INTRAVENOUS EVERY 6 HOURS PRN
Status: DISCONTINUED | OUTPATIENT
Start: 2020-08-04 | End: 2020-08-05 | Stop reason: HOSPADM

## 2020-08-04 RX ORDER — SODIUM CHLORIDE 0.9 % (FLUSH) 0.9 %
10 SYRINGE (ML) INJECTION PRN
Status: DISCONTINUED | OUTPATIENT
Start: 2020-08-04 | End: 2020-08-04 | Stop reason: HOSPADM

## 2020-08-04 RX ORDER — LABETALOL 20 MG/4 ML (5 MG/ML) INTRAVENOUS SYRINGE
5 EVERY 10 MIN PRN
Status: DISCONTINUED | OUTPATIENT
Start: 2020-08-04 | End: 2020-08-04 | Stop reason: HOSPADM

## 2020-08-04 RX ORDER — MORPHINE SULFATE 2 MG/ML
2 INJECTION, SOLUTION INTRAMUSCULAR; INTRAVENOUS
Status: DISCONTINUED | OUTPATIENT
Start: 2020-08-04 | End: 2020-08-05 | Stop reason: HOSPADM

## 2020-08-04 RX ORDER — MEPERIDINE HYDROCHLORIDE 25 MG/ML
12.5 INJECTION INTRAMUSCULAR; INTRAVENOUS; SUBCUTANEOUS EVERY 5 MIN PRN
Status: DISCONTINUED | OUTPATIENT
Start: 2020-08-04 | End: 2020-08-04 | Stop reason: HOSPADM

## 2020-08-04 RX ORDER — ACETAMINOPHEN 325 MG/1
650 TABLET ORAL EVERY 4 HOURS PRN
Status: DISCONTINUED | OUTPATIENT
Start: 2020-08-04 | End: 2020-08-05 | Stop reason: HOSPADM

## 2020-08-04 RX ORDER — SODIUM CHLORIDE 0.9 % (FLUSH) 0.9 %
10 SYRINGE (ML) INJECTION EVERY 12 HOURS SCHEDULED
Status: DISCONTINUED | OUTPATIENT
Start: 2020-08-04 | End: 2020-08-05 | Stop reason: HOSPADM

## 2020-08-04 RX ORDER — TRAMADOL HYDROCHLORIDE 50 MG/1
50 TABLET ORAL EVERY 4 HOURS PRN
Status: DISCONTINUED | OUTPATIENT
Start: 2020-08-04 | End: 2020-08-04 | Stop reason: HOSPADM

## 2020-08-04 RX ORDER — MAGNESIUM HYDROXIDE/ALUMINUM HYDROXICE/SIMETHICONE 120; 1200; 1200 MG/30ML; MG/30ML; MG/30ML
15 SUSPENSION ORAL EVERY 6 HOURS PRN
Status: DISCONTINUED | OUTPATIENT
Start: 2020-08-04 | End: 2020-08-05 | Stop reason: HOSPADM

## 2020-08-04 RX ORDER — SODIUM CHLORIDE 9 MG/ML
50 INJECTION, SOLUTION INTRAVENOUS CONTINUOUS
Status: ACTIVE | OUTPATIENT
Start: 2020-08-04 | End: 2020-08-05

## 2020-08-04 RX ORDER — MAGNESIUM HYDROXIDE 1200 MG/15ML
LIQUID ORAL CONTINUOUS PRN
Status: COMPLETED | OUTPATIENT
Start: 2020-08-04 | End: 2020-08-04

## 2020-08-04 RX ORDER — FENTANYL CITRATE 50 UG/ML
25 INJECTION, SOLUTION INTRAMUSCULAR; INTRAVENOUS EVERY 5 MIN PRN
Status: DISCONTINUED | OUTPATIENT
Start: 2020-08-04 | End: 2020-08-04 | Stop reason: HOSPADM

## 2020-08-04 RX ORDER — PROCHLORPERAZINE EDISYLATE 5 MG/ML
5 INJECTION INTRAMUSCULAR; INTRAVENOUS
Status: DISCONTINUED | OUTPATIENT
Start: 2020-08-04 | End: 2020-08-04 | Stop reason: HOSPADM

## 2020-08-04 RX ORDER — MIDAZOLAM HYDROCHLORIDE 1 MG/ML
INJECTION INTRAMUSCULAR; INTRAVENOUS PRN
Status: DISCONTINUED | OUTPATIENT
Start: 2020-08-04 | End: 2020-08-04 | Stop reason: SDUPTHER

## 2020-08-04 RX ORDER — PROMETHAZINE HYDROCHLORIDE 25 MG/ML
6.25 INJECTION, SOLUTION INTRAMUSCULAR; INTRAVENOUS
Status: DISCONTINUED | OUTPATIENT
Start: 2020-08-04 | End: 2020-08-04 | Stop reason: HOSPADM

## 2020-08-04 RX ORDER — LIDOCAINE HYDROCHLORIDE 20 MG/ML
INJECTION, SOLUTION INTRAVENOUS PRN
Status: DISCONTINUED | OUTPATIENT
Start: 2020-08-04 | End: 2020-08-04 | Stop reason: SDUPTHER

## 2020-08-04 RX ORDER — SODIUM CHLORIDE 0.9 % (FLUSH) 0.9 %
10 SYRINGE (ML) INJECTION EVERY 12 HOURS SCHEDULED
Status: DISCONTINUED | OUTPATIENT
Start: 2020-08-04 | End: 2020-08-04 | Stop reason: HOSPADM

## 2020-08-04 RX ORDER — SODIUM CHLORIDE 0.9 % (FLUSH) 0.9 %
10 SYRINGE (ML) INJECTION PRN
Status: DISCONTINUED | OUTPATIENT
Start: 2020-08-04 | End: 2020-08-05 | Stop reason: HOSPADM

## 2020-08-04 RX ORDER — SODIUM CHLORIDE, SODIUM LACTATE, POTASSIUM CHLORIDE, CALCIUM CHLORIDE 600; 310; 30; 20 MG/100ML; MG/100ML; MG/100ML; MG/100ML
INJECTION, SOLUTION INTRAVENOUS CONTINUOUS PRN
Status: DISCONTINUED | OUTPATIENT
Start: 2020-08-04 | End: 2020-08-04 | Stop reason: SDUPTHER

## 2020-08-04 RX ORDER — FENTANYL CITRATE 50 UG/ML
INJECTION, SOLUTION INTRAMUSCULAR; INTRAVENOUS PRN
Status: DISCONTINUED | OUTPATIENT
Start: 2020-08-04 | End: 2020-08-04 | Stop reason: SDUPTHER

## 2020-08-04 RX ADMIN — PROPOFOL 250 MG: 10 INJECTION, EMULSION INTRAVENOUS at 13:02

## 2020-08-04 RX ADMIN — MORPHINE SULFATE 2 MG: 2 INJECTION, SOLUTION INTRAMUSCULAR; INTRAVENOUS at 16:32

## 2020-08-04 RX ADMIN — MORPHINE SULFATE 2 MG: 2 INJECTION, SOLUTION INTRAMUSCULAR; INTRAVENOUS at 03:22

## 2020-08-04 RX ADMIN — ONDANSETRON 4 MG: 2 INJECTION INTRAMUSCULAR; INTRAVENOUS at 13:22

## 2020-08-04 RX ADMIN — LIDOCAINE HYDROCHLORIDE 80 MG: 20 INJECTION, SOLUTION INTRAVENOUS at 13:02

## 2020-08-04 RX ADMIN — PHENYLEPHRINE HYDROCHLORIDE 100 MCG: 10 INJECTION INTRAVENOUS at 13:09

## 2020-08-04 RX ADMIN — VANCOMYCIN HYDROCHLORIDE 1250 MG: 5 INJECTION, POWDER, LYOPHILIZED, FOR SOLUTION INTRAVENOUS at 18:20

## 2020-08-04 RX ADMIN — MORPHINE SULFATE 4 MG: 4 INJECTION, SOLUTION INTRAMUSCULAR; INTRAVENOUS at 18:24

## 2020-08-04 RX ADMIN — FENTANYL CITRATE 50 MCG: 50 INJECTION, SOLUTION INTRAMUSCULAR; INTRAVENOUS at 13:02

## 2020-08-04 RX ADMIN — DEXAMETHASONE SODIUM PHOSPHATE 8 MG: 4 INJECTION INTRA-ARTICULAR; INTRALESIONAL; INTRAMUSCULAR; INTRAVENOUS; SOFT TISSUE at 13:15

## 2020-08-04 RX ADMIN — MORPHINE SULFATE 2 MG: 2 INJECTION, SOLUTION INTRAMUSCULAR; INTRAVENOUS at 00:24

## 2020-08-04 RX ADMIN — FENTANYL CITRATE 50 MCG: 50 INJECTION, SOLUTION INTRAMUSCULAR; INTRAVENOUS at 14:19

## 2020-08-04 RX ADMIN — MIDAZOLAM HYDROCHLORIDE 2 MG: 2 INJECTION, SOLUTION INTRAMUSCULAR; INTRAVENOUS at 12:57

## 2020-08-04 RX ADMIN — PIPERACILLIN AND TAZOBACTAM 3.38 G: 3; .375 INJECTION, POWDER, LYOPHILIZED, FOR SOLUTION INTRAVENOUS at 06:23

## 2020-08-04 RX ADMIN — ACETAMINOPHEN 1000 MG: 500 TABLET ORAL at 12:05

## 2020-08-04 RX ADMIN — PIPERACILLIN AND TAZOBACTAM 3.38 G: 3; .375 INJECTION, POWDER, LYOPHILIZED, FOR SOLUTION INTRAVENOUS at 12:28

## 2020-08-04 RX ADMIN — SODIUM CHLORIDE, POTASSIUM CHLORIDE, SODIUM LACTATE AND CALCIUM CHLORIDE: 600; 310; 30; 20 INJECTION, SOLUTION INTRAVENOUS at 00:24

## 2020-08-04 RX ADMIN — VANCOMYCIN HYDROCHLORIDE 1250 MG: 5 INJECTION, POWDER, LYOPHILIZED, FOR SOLUTION INTRAVENOUS at 07:15

## 2020-08-04 RX ADMIN — MORPHINE SULFATE 2 MG: 2 INJECTION, SOLUTION INTRAMUSCULAR; INTRAVENOUS at 09:34

## 2020-08-04 RX ADMIN — SODIUM CHLORIDE, POTASSIUM CHLORIDE, SODIUM LACTATE AND CALCIUM CHLORIDE: 600; 310; 30; 20 INJECTION, SOLUTION INTRAVENOUS at 12:58

## 2020-08-04 RX ADMIN — MORPHINE SULFATE 2 MG: 2 INJECTION, SOLUTION INTRAMUSCULAR; INTRAVENOUS at 20:33

## 2020-08-04 RX ADMIN — PHENYLEPHRINE HYDROCHLORIDE 100 MCG: 10 INJECTION INTRAVENOUS at 13:12

## 2020-08-04 RX ADMIN — PIPERACILLIN AND TAZOBACTAM 3.38 G: 3; .375 INJECTION, POWDER, LYOPHILIZED, FOR SOLUTION INTRAVENOUS at 01:03

## 2020-08-04 RX ADMIN — MORPHINE SULFATE 2 MG: 2 INJECTION, SOLUTION INTRAMUSCULAR; INTRAVENOUS at 06:26

## 2020-08-04 ASSESSMENT — ENCOUNTER SYMPTOMS
COLOR CHANGE: 1
RESPIRATORY NEGATIVE: 1
DIARRHEA: 0
COUGH: 0
SHORTNESS OF BREATH: 0

## 2020-08-04 ASSESSMENT — PULMONARY FUNCTION TESTS
PIF_VALUE: 10
PIF_VALUE: 3
PIF_VALUE: 10
PIF_VALUE: 1
PIF_VALUE: 11
PIF_VALUE: 3
PIF_VALUE: 8
PIF_VALUE: 0
PIF_VALUE: 10
PIF_VALUE: 4
PIF_VALUE: 10
PIF_VALUE: 10
PIF_VALUE: 15
PIF_VALUE: 10
PIF_VALUE: 0
PIF_VALUE: 10
PIF_VALUE: 1
PIF_VALUE: 11
PIF_VALUE: 8
PIF_VALUE: 10
PIF_VALUE: 10
PIF_VALUE: 0
PIF_VALUE: 3
PIF_VALUE: 3
PIF_VALUE: 10
PIF_VALUE: 0
PIF_VALUE: 10
PIF_VALUE: 11

## 2020-08-04 ASSESSMENT — PAIN SCALES - GENERAL
PAINLEVEL_OUTOF10: 7
PAINLEVEL_OUTOF10: 5
PAINLEVEL_OUTOF10: 8

## 2020-08-04 NOTE — OP NOTE
[address]                      [address], [address] [facility]                                OPERATIVE REPORT    PATIENT NAME: Mar Luis                   :        1975  MED REC NO:   [id number]                            ROOM:  ACCOUNT NO:   [de-identified]                           ADMIT DATE: 2020  PROVIDER:     Preston Barth MD    DATE OF PROCEDURE:  2020    LOCATION:  [facility]. PREOPERATIVE DIAGNOSIS:  Left fourth finger abscess. POSTOPERATIVE DIAGNOSIS:  Left fourth finger abscess. PROCEDURE:  Irrigation and debridement of the left fourth finger  abscess. SURGEON:  Preston Barth MD    ASSISTANT:  Marce Carter PA-C was present throughout the entire case. Given the nature of the disease process and the procedure, a skilled  surgical first assistant was necessary during the case. The assistant  was necessary to hold retractors and manipulate the extremity during the  procedure. A certified scrub tech was at the back table managing the  instruments and supplies for the surgical case. ANESTHESIA:  LMA plus block. COMPLICATIONS:  None. ESTIMATED BLOOD LOSS:  Minimal.    INDICATION:  The patient is a 68-year-old male status post several  previous puncture wounds to the left fourth finger. He states he tried  to remove them by exploring the wounds, but was unable to do so. He  developed significant swelling and pain. He is noted to have a large  abscess of the fourth finger. He was admitted to the hospital for  potential operative irrigation and debridement. Risks and benefits of  surgery were noted with the patient. These do include further  infection, stiffness, nerve damage, continued pain and numbness as well  as need for subsequent operation. Informed consent was obtained prior  to arrival in the operating room. OPERATIVE PROCEDURE:  Upon arrival, the patient was identified.   He was  transported from a stretcher to the operating table and placed in the  supine position. A LMA was administered by the Anesthesia staff. The  patient had been receiving intravenous vancomycin. A tourniquet was  placed about the left forearm. The left arm was prepped and draped in  usual sterile fashion. The arm was exsanguinated and the tourniquet was  inflated to 250 mmHg. A 3 cm incision was made over the radial aspect  of the left fourth finger in line with obvious abscess. The incision  was carried through the subcutaneous tissue. Hemostasis was obtained. A large amount of purulent material was discharged and cultures x2 were  sent for further analysis. The wounds were elevated both volarly and  dorsally to remove any pockets of purulent material.  At this point, the  wound was copiously irrigated with sterile saline. There were several  areas of necrotic tissue which were also debrided with care being taken  to avoid damage to the neurovascular structures. After final  debridement, a finishing irrigation was performed. The wound was gently  packed with 1/2 inch iodoform. The proximal and distal portion of the  wound were closed with 4-0 nylon with the central portion remaining  open. At this point, a digital block was performed using 0.5% plain  Marcaine for postoperative pain control. Sterile dressing was applied. He was placed in a well-padded splint. He was awoken from anesthetic  and taken to recovery room in stable condition.         Julio Baker MD    D: 08/04/2020 13:51:16       T: 08/04/2020 13:56:56     [email or web address]  Job#: [id number]     Doc#: [id number]    CC:

## 2020-08-04 NOTE — PROGRESS NOTES
Physical Therapy Med Surg Initial Assessment  [facility]Department[facility]  [facility]       NAME: Alina Guaman  : 1975 (53 y.o.)  MRN: [id number]  CODE STATUS: Full Code    Date of Service: 2020    Patient Diagnosis(es): Tenosynovitis of finger [M65.9]   Chief Complaint   Patient presents with    Wound Infection     ring finger left hand, states that there are splinters stuck in finger     Patient Active Problem List    Diagnosis Date Noted    Tenosynovitis of finger 2020        Past Medical History:   Diagnosis Date    Cancer [facility])     history of testicular      Past Surgical History:   Procedure Laterality Date    BACK SURGERY      KNEE SURGERY      SKULL FRACTURE ELEVATION         Chart Reviewed: Yes  Family / Caregiver Present: No  General Comment  Comments: Pt resting in bed - agreeable to PT evaluation    Restrictions:  Upper Extremity Weight Bearing Restrictions  Left Upper Extremity Weight Bearing: Non Weight Bearing     SUBJECTIVE: Subjective: \"I want to go have a cigg. \"    Pain  Pre Treatment Pain Screening  Comments / Details: denies    Post Treatment Pain Screening:   Pain Assessment  Pain Assessment: (denies)    Prior Level of Function:  Social/Functional History  Lives With: Alone  Type of Home: House  Home Layout: One level    OBJECTIVE:   Vision: Within Functional Limits  Hearing: Within functional limits    Cognition:  Overall Orientation Status: Within Functional Limits  Follows Commands: Within Functional Limits    Observation/Palpation  Observation: L hand splinted.  No acute distress    ROM:  RLE PROM: WFL  LLE PROM: WFL    Strength:  Strength RLE  Strength RLE: WFL  Strength LLE  Strength LLE: WFL    Neuro:  Balance  Sitting - Static: Good  Sitting - Dynamic: Good  Standing - Static: Good  Standing - Dynamic: Good     Motor Control  Gross Motor?: WFL  Sensation  Overall Sensation Status: (pt reports numbness L hand)    Bed mobility  Rolling to Left: Independent  Supine to Sit: Independent  Sit to Supine: Independent    Transfers  Sit to Stand: Independent  Stand to sit: Independent  Bed to Chair: Independent    Ambulation  Ambulation?: Yes  Ambulation 1  Surface: level tile  Device: No Device  Assistance: Independent  Gait Deviations: None  Distance: 300ft    Stairs/Curb  Stairs?: No         Activity Tolerance  Activity Tolerance: Patient Tolerated treatment well          PT Education  PT Education: Goals;PT Role;Plan of Care    ASSESSMENT:   Decision Making: Low Complexity  History: Med  Exam: Med  Clinical Presentation: Med    Prognosis: Good    DISCHARGE RECOMMENDATIONS:       Assessment: No concerns. Pt indep with functional mobility. No further PT indicated. PLAN OF CARE:  Plan  Plan Comment: eval only  Safety Devices  Type of devices: Call light within reach    Goals:  Short term goals  Short term goal 1: NA    [facility] (6 CLICK) BASIC MOBILITY  AM-PAC Inpatient Mobility Raw Score : 24     Therapy Time:   Individual   Time In 1600   Time Out 1610   Minutes Jaleesa Mayra Vega, [place], 08/04/20 at 4:46 PM         Definitions for assistance levels  Independent = pt does not require any physical supervision or assistance from another person for activity completion. Device may be needed.   Stand by assistance = pt requires verbal cues or instructions from another person, close to but not touching, to perform the activity  Minimal assistance= pt performs 75% or more of the activity; assistance is required to complete the activity  Moderate assistance= pt performs 50% of the activity; assistance is required to complete the activity  Maximal assistance = pt performs 25% of the activity; assistance is required to complete the activity  Dependent = pt requires total physical assistance to accomplish the task

## 2020-08-04 NOTE — PROGRESS NOTES
Luis James is a 53 y.o. male patient.   Going for surgery today  Current Facility-Administered Medications   Medication Dose Route Frequency Provider Last Rate Last Dose    lactated ringers infusion   Intravenous Continuous Jeanine Vanessa DO 50 mL/hr at 08/04/20 0024      sodium chloride flush 0.9 % injection 10 mL  10 mL Intravenous 2 times per day David Camacho, DO        sodium chloride flush 0.9 % injection 10 mL  10 mL Intravenous PRN Jeanine Mills,         acetaminophen (TYLENOL) tablet 1,000 mg  1,000 mg Oral Once Jeanine Vanessa, DO        piperacillin-tazobactam (ZOSYN) 3.375 g in dextrose 5 % 50 mL IVPB (mini-bag)  3.375 g Intravenous Q6H Jeanine Vanessa DO   Stopped at 08/04/20 0703    sodium chloride flush 0.9 % injection 10 mL  10 mL Intravenous 2 times per day Mayra Phillip, APRN - CNP   10 mL at 08/03/20 2054    sodium chloride flush 0.9 % injection 10 mL  10 mL Intravenous PRN Mayra Phillip, APRN - CNP        acetaminophen (TYLENOL) tablet 1,000 mg  1,000 mg Oral Once Mayra Phillip, APRN - CNP        oxyCODONE (ROXICODONE) immediate release tablet 5 mg  5 mg Oral Q6H PRN Mayra Gu, APRN - CNP   5 mg at 08/03/20 1550    morphine (PF) injection 2 mg  2 mg Intravenous Q2H PRN Mayra Gu, APRN - CNP   2 mg at 08/04/20 0934    magnesium hydroxide (MILK OF MAGNESIA) 400 MG/5ML suspension 30 mL  30 mL Oral Daily PRN Mayra Gu, APRN - CNP        promethazine (PHENERGAN) tablet 12.5 mg  12.5 mg Oral Q6H PRN Mayra Gu, APRN - CNP        Or    ondansetron (ZOFRAN) injection 4 mg  4 mg Intravenous Q6H PRN Mayra Gu, APRN - CNP        vitamin B-1 (THIAMINE) tablet 100 mg  100 mg Oral Daily Milly De La O MD   100 mg at 26/87/16 5720    folic acid (FOLVITE) tablet 1 mg  1 mg Oral Daily Milly De La O MD   1 mg at 08/03/20 1933    vancomycin (VANCOCIN) intermittent dosing (placeholder)   Other RX Placeholder Milly De La O MD        vancomycin (VANCOCIN) 1,250 mg in dextrose 5 % 250 mL IVPB  1,250 mg Intravenous Q12H Lima Rodriguez MD   Stopped at 08/04/20 0936     No Known Allergies  Active Problems:    Tenosynovitis of finger  Resolved Problems:    * No resolved hospital problems. *    Blood pressure 130/83, pulse 60, temperature 99 °F (37.2 °C), temperature source Temporal, resp. rate 16, height 5' 9\" (1.753 m), weight 180 lb (81.6 kg), SpO2 96 %. Subjective:  Symptoms:  No shortness of breath, malaise, cough, chest pain, weakness, headache, chest pressure, anorexia, diarrhea or anxiety. Objective:  General Appearance:  Comfortable and well-appearing. Vital signs: (most recent): Blood pressure 130/83, pulse 60, temperature 99 °F (37.2 °C), temperature source Temporal, resp. rate 16, height 5' 9\" (1.753 m), weight 180 lb (81.6 kg), SpO2 96 %. HEENT: Normal HEENT exam.    Lungs:  Normal effort. Heart: Normal rate. Regular rhythm. S1 normal and S2 normal.    Abdomen: Abdomen is soft. There is no epigastric area or suprapubic area tenderness. Neurological: Patient is alert and oriented to person, place and time. Pupils:  Pupils are equal, round, and reactive to light. Skin:  Warm and dry.       Assessment & Plan  1) Tenosynovitis  C/w IV abx  MRSA posiitve  2) tobacco use counseling given  3) etoh abuse  Monitor for withdrawal  4) DVT ppx  Jun Merrill MD  8/4/2020

## 2020-08-04 NOTE — PROGRESS NOTES
Pharmacy Vancomycin Consult     Vancomycin Day: 2  Current Dosing: vancomycin 1250mg IV Q12 hours     Recent Labs     08/03/20  1045   BUN 7       Recent Labs     08/03/20  1045   CREATININE 0.82       Recent Labs     08/03/20  1045   WBC 11.7*         Intake/Output Summary (Last 24 hours) at 8/4/2020 1759  Last data filed at 8/4/2020 1338  Gross per 24 hour   Intake 626.47 ml   Output 5 ml   Net 621.47 ml       Culture Date      Source                       Results  Culture, Surgical [[id number]]   Collected: 08/04/20 1332    Order Status: Completed  Specimen: Hand  Updated: 08/04/20 1428     Gram Stain Result  Moderate WBC's   No epithelial cells   Few Gram positive cocci in clusters-resembling Staph    Narrative:      ORDER#: [id number]                          ORDERED BY: Laquita Mccarthy   SOURCE: Hand Left Finger, Ring             COLLECTED:  08/04/20 13:32   ANTIBIOTICS AT DOUG.:                      RECEIVED :  08/04/20 14:08    Culture, Anaerobic [[id number]]   Collected: 08/04/20 1332    Order Status: Sent  Specimen: Swab from Finger     Culture, Blood 2 [[id number]]   Collected: 08/03/20 1121    Order Status: Completed  Specimen: Blood  Updated: 08/04/20 1215     Culture, Blood 2  No Growth to date. Any change in status will be called. Narrative:      ORDER#: [id number]                          ORDERED BY: Juan David Lainez   SOURCE: Blood                              COLLECTED:  08/03/20 11:21   ANTIBIOTICS AT DOUG.:                      RECEIVED :  08/03/20 11:28    Culture, Blood 1 [[id number]]   Collected: 08/03/20 1101    Order Status: Completed  Specimen: Blood  Updated: 08/04/20 1215     Blood Culture, Routine  No Growth to date. Any change in status will be called.     Narrative:      ORDER#: [id number]                          ORDERED BY: Juan David Lainez   SOURCE: Blood                              COLLECTED:  08/03/20 11:01   ANTIBIOTICS AT DOUG.:                      RECEIVED :  08/03/20 11:01 Culture, Anaerobic and Aerobic [[id number]]  (Abnormal)  Collected: 08/03/20 1101    Order Status: Completed  Specimen: Finger  Updated: 08/04/20 1022     Gram Stain Result  Few WBC's   Rare epithelial cells   Moderate Gram positive cocci in clusters-resembling Staph   Abnormal       Anaerobic Culture  Culture in progress     Organism  Staph aureus MRSAAbnormal       WOUND/ABSCESS  --Abnormal       Heavy growth   Sensitivity to follow   CONTACT PRECAUTIONS INDICATED   PBP2= POSITIVE        Ht Readings from Last 1 Encounters:   08/03/20 5' 9\" (1.753 m)        Wt Readings from Last 1 Encounters:   08/03/20 180 lb (81.6 kg)         Body mass index is 26.58 kg/m². Estimated Creatinine Clearance: 113 mL/min (based on SCr of 0.82 mg/dL). Trough: due 08/05/20 @ 2370    Assessment/Plan:  Renal function remains stable today - as such, continue with current therapy of vancomycin 1250mg IV Q12 hours, with trough level due prior to the FOURTH total dose of vancomycin therapy 08/05/20 @ 0645 (goal trough 15-20mcg/mL for MRSA in wound; s/p drainage) based on the following administration times:   FIRST dose: 08/03 @ 2054  SECOND dose: 08/04 @ 0715  THIRD dose due 08/04 @ 1915  TROUGH ordered 08/05 @ 0645   FOURTH dose due 08/05 @ 0715   Additionally, dose of 1500mg IV x 1 dose discontinued, as patient already on Q12 hour dosing.      Dorys Ojeda, PharmD   8/4/2020 6:03 PM

## 2020-08-04 NOTE — BRIEF OP NOTE
Brief Postoperative Note      Patient: Caro Levy  YOB: 1975  MRN: [id number]    Date of Procedure: 8/4/2020    Pre-Op Diagnosis: INPATIENT    Post-Op Diagnosis: Same       Procedure(s):  WASHOUT I&D LEFT RING FINGER ROOM    Surgeon(s):  Dominga Iyer MD    Assistant:  Physician Assistant: Vero Negrete PA-C    Anesthesia: General    Estimated Blood Loss (mL): Minimal    Complications: None    Specimens:   ID Type Source Tests Collected by Time Destination   1 : LEFT RING FINGER Swab Finger CULTURE, ANAEROBIC Dominga Iyer MD 8/4/2020 1332        Implants:  * No implants in log *      Drains: * No LDAs found *    Findings: none    Electronically signed by Dominga Iyer MD on 8/4/2020 at 1:50 PM

## 2020-08-04 NOTE — PROGRESS NOTES
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 Respiratory: Negative. Cardiovascular: Negative. Skin: Positive for color change and wound. Physical Exam   Neck: Neck supple. Cardiovascular: Normal heart sounds. No murmur heard. Pulmonary/Chest: Breath sounds normal. No respiratory distress. He has no wheezes. He has no rales. He exhibits no tenderness. Abdominal: Soft. Bowel sounds are normal. He exhibits no distension. There is no abdominal tenderness. There is no rebound and no guarding. Hand wrapped    Blood pressure 117/74, pulse 64, temperature 97 °F (36.1 °C), temperature source Temporal, resp. rate 11, height 5' 9\" (1.753 m), weight 180 lb (81.6 kg), SpO2 94 %.       .   Lab Results   Component Value Date    WBC 11.7 (H) 08/03/2020    HGB 16.3 08/03/2020    HCT 48.2 08/03/2020    MCV 95.7 08/03/2020     08/03/2020     Lab Results   Component Value Date     08/03/2020    K 3.3 08/03/2020     08/03/2020    CO2 25 08/03/2020    BUN 7 08/03/2020    CREATININE 0.82 08/03/2020    GLUCOSE 119 08/03/2020    CALCIUM 9.0 08/03/2020        Culture, Anaerobic and Aerobic [[id number]]  (Abnormal)  Collected: 08/03/20 1101    Order Status: Completed  Specimen: Finger  Updated: 08/04/20 1022     Gram Stain Result  Few WBC's Rare epithelial cells   Moderate Gram positive cocci in clusters-resembling Staph   Abnormal       Anaerobic Culture  Culture in progress     Organism  Staph aureus MRSAAbnormal       WOUND/ABSCESS  --Abnormal       Heavy growth   Sensitivity to follow   CONTACT PRECAUTIONS              ASSESSMENT:  Patient Active Problem List   Diagnosis    Tenosynovitis of finger         PLAN:    Tenosynovitis abscess  left fourth finger     drainage     Continue vancomycin   Will probably need 2 weeks IV vancomycin

## 2020-08-04 NOTE — ANESTHESIA PRE PROCEDURE
Department of Anesthesiology  Preprocedure Note       Name:  Geoff Will   Age:  53 y.o.  :  1975                                          MRN:  [id number]         Date:  2020      Surgeon: Susana Alexander):  Carmenza Zuñiga MD    Procedure: Procedure(s):  WASHOUT I&D LEFT RING FINGER ROOM    Medications prior to admission:   Prior to Admission medications    Not on File       Current medications:    Current Facility-Administered Medications   Medication Dose Route Frequency Provider Last Rate Last Dose    lactated ringers infusion   Intravenous Continuous Jeanine Vanessa, DO 50 mL/hr at 20 0024      sodium chloride flush 0.9 % injection 10 mL  10 mL Intravenous 2 times per day Jack Gomes, DO        sodium chloride flush 0.9 % injection 10 mL  10 mL Intravenous PRN Jeanine Sevilla, DO        acetaminophen (TYLENOL) tablet 1,000 mg  1,000 mg Oral Once Jeanine Vanessa, DO        piperacillin-tazobactam (ZOSYN) 3.375 g in dextrose 5 % 50 mL IVPB (mini-bag)  3.375 g Intravenous Q6H Jeanine Vanessa, DO   Stopped at 20 0703    sodium chloride flush 0.9 % injection 10 mL  10 mL Intravenous 2 times per day [place] Melissa, APRN - CNP   10 mL at 20 2054    sodium chloride flush 0.9 % injection 10 mL  10 mL Intravenous PRN [place] Melissa, APRN - CNP        acetaminophen (TYLENOL) tablet 1,000 mg  1,000 mg Oral Once [place] Melissa, APRN - CNP        oxyCODONE (ROXICODONE) immediate release tablet 5 mg  5 mg Oral Q6H PRN [place] Melissa, APRN - CNP   5 mg at 20 1550    morphine (PF) injection 2 mg  2 mg Intravenous Q2H PRN [place] Melissa, APRN - CNP   2 mg at 20 0934    magnesium hydroxide (MILK OF MAGNESIA) 400 MG/5ML suspension 30 mL  30 mL Oral Daily PRN [place] Melissa, APRN - CNP        promethazine (PHENERGAN) tablet 12.5 mg  12.5 mg Oral Q6H PRN [place] Melissa, APRN - CNP        Or    ondansetron (ZOFRAN) injection 4 mg  4 mg Intravenous Q6H PRN Ciera T Debbi Giles, APRN - CNP        vitamin B-1 (THIAMINE) tablet 100 mg  100 mg Oral Daily Scarlett Guzman MD   100 mg at 85/80/69 1915    folic acid (FOLVITE) tablet 1 mg  1 mg Oral Daily Scarlett Guzman MD   1 mg at 08/03/20 1933    vancomycin (VANCOCIN) intermittent dosing (placeholder)   Other RX Placeholder Scarlett Guzman MD        vancomycin (VANCOCIN) 1,250 mg in dextrose 5 % 250 mL IVPB  1,250 mg Intravenous Q12H Scarlett Guzman MD   Stopped at 08/04/20 0881       Allergies:  No Known Allergies    Problem List:    Patient Active Problem List   Diagnosis Code    Tenosynovitis of finger M65.9       Past Medical History:        Diagnosis Date    Cancer ([facility] [address]     history of testicular        Past Surgical History:        Procedure Laterality Date    BACK SURGERY      KNEE SURGERY      SKULL FRACTURE ELEVATION         Social History:    Social History     Tobacco Use    Smoking status: Current Every Day Smoker     Packs/day: 1.00     Years: 20.00     Pack years: 20.00    Smokeless tobacco: Never Used   Substance Use Topics    Alcohol use:  Yes                                Ready to quit: Not Answered  Counseling given: Not Answered      Vital Signs (Current):   Vitals:    08/03/20 1309 08/03/20 2049 08/04/20 0730 08/04/20 1045   BP: (!) 146/99 (!) 146/78 137/80 130/83   Pulse: 77 79 68 60   Resp: 18 15 14 16   Temp:  98.2 °F (36.8 °C) 97.7 °F (36.5 °C) 99 °F (37.2 °C)   TempSrc: Oral Oral Oral Temporal   SpO2: 97% 96% 95% 96%   Weight:       Height:                                                  BP Readings from Last 3 Encounters:   08/04/20 130/83   07/22/19 120/83   02/02/19 125/80       NPO Status: Time of last liquid consumption: 1800                        Time of last solid consumption: 1800                        Date of last liquid consumption: 08/03/20                        Date of last solid food consumption: 08/03/20    BMI:   Wt Readings from Last 3 Encounters:   08/03/20 180 lb (81.6 kg) 07/22/19 180 lb (81.6 kg)   02/02/19 175 lb (79.4 kg)     Body mass index is 26.58 kg/m². CBC:   Lab Results   Component Value Date    WBC 11.7 08/03/2020    RBC 5.04 08/03/2020    HGB 16.3 08/03/2020    HCT 48.2 08/03/2020    MCV 95.7 08/03/2020    RDW 14.0 08/03/2020     08/03/2020       CMP:   Lab Results   Component Value Date     08/03/2020    K 3.3 08/03/2020     08/03/2020    CO2 25 08/03/2020    BUN 7 08/03/2020    CREATININE 0.82 08/03/2020    GFRAA >60.0 08/03/2020    LABGLOM >60.0 08/03/2020    GLUCOSE 119 08/03/2020    PROT 7.0 08/03/2020    CALCIUM 9.0 08/03/2020    BILITOT 0.7 08/03/2020    ALKPHOS 76 08/03/2020    AST 18 08/03/2020    ALT 20 08/03/2020       POC Tests: No results for input(s): POCGLU, POCNA, POCK, POCCL, POCBUN, POCHEMO, POCHCT in the last 72 hours.     Coags:   Lab Results   Component Value Date    PROTIME 12.9 08/03/2020    INR 1.0 08/03/2020    APTT 29.5 08/03/2020       HCG (If Applicable): No results found for: PREGTESTUR, PREGSERUM, HCG, HCGQUANT     ABGs: No results found for: PHART, PO2ART, SQC9FHZ, PAL4CZJ, BEART, M6YHWPXJ     Type & Screen (If Applicable):  No results found for: LABABO, LABRH    Drug/Infectious Status (If Applicable):  No results found for: HIV, HEPCAB    COVID-19 Screening (If Applicable): No results found for: COVID19      Anesthesia Evaluation  Patient summary reviewed and Nursing notes reviewed no history of anesthetic complications:   Airway: Mallampati: II  TM distance: >3 FB   Neck ROM: full  Mouth opening: > = 3 FB Dental: normal exam         Pulmonary:Negative Pulmonary ROS and normal exam                               Cardiovascular:Negative CV ROS  Exercise tolerance: good (>4 METS),           Rhythm: regular  Rate: normal           Beta Blocker:  Not on Beta Blocker         Neuro/Psych:   Negative Neuro/Psych ROS              GI/Hepatic/Renal: Neg GI/Hepatic/Renal ROS            Endo/Other: Negative Endo/Other ROS

## 2020-08-04 NOTE — PROGRESS NOTES
Finger looks worse today    Increased redness, swelling, limited ROM    Will plan on I and D in OR today  Maybe need wound team, ID  Cont antibx for now  NPO  Risks noted with pt and he agrees to proceed

## 2020-08-05 ENCOUNTER — APPOINTMENT (OUTPATIENT)
Dept: INTERVENTIONAL RADIOLOGY/VASCULAR | Age: 45
DRG: 316 | End: 2020-08-05
Payer: COMMERCIAL

## 2020-08-05 VITALS
TEMPERATURE: 97.9 F | OXYGEN SATURATION: 95 % | RESPIRATION RATE: 17 BRPM | SYSTOLIC BLOOD PRESSURE: 127 MMHG | HEIGHT: 69 IN | BODY MASS INDEX: 26.66 KG/M2 | DIASTOLIC BLOOD PRESSURE: 71 MMHG | WEIGHT: 180 LBS | HEART RATE: 74 BPM

## 2020-08-05 LAB
ANAEROBIC CULTURE: ABNORMAL
GRAM STAIN RESULT: ABNORMAL
ORGANISM: ABNORMAL
VANCOMYCIN TROUGH: 13.3 UG/ML (ref 10–20)
WOUND/ABSCESS: ABNORMAL

## 2020-08-05 PROCEDURE — 02HV33Z INSERTION OF INFUSION DEVICE INTO SUPERIOR VENA CAVA, PERCUTANEOUS APPROACH: ICD-10-PCS | Performed by: ORTHOPAEDIC SURGERY

## 2020-08-05 PROCEDURE — 6370000000 HC RX 637 (ALT 250 FOR IP): Performed by: INTERNAL MEDICINE

## 2020-08-05 PROCEDURE — 6360000002 HC RX W HCPCS: Performed by: ORTHOPAEDIC SURGERY

## 2020-08-05 PROCEDURE — 80202 ASSAY OF VANCOMYCIN: CPT

## 2020-08-05 PROCEDURE — 36415 COLL VENOUS BLD VENIPUNCTURE: CPT

## 2020-08-05 PROCEDURE — C1769 GUIDE WIRE: HCPCS

## 2020-08-05 PROCEDURE — 2500000003 HC RX 250 WO HCPCS: Performed by: INTERNAL MEDICINE

## 2020-08-05 PROCEDURE — 6360000002 HC RX W HCPCS: Performed by: INTERNAL MEDICINE

## 2020-08-05 PROCEDURE — 2580000003 HC RX 258: Performed by: INTERNAL MEDICINE

## 2020-08-05 PROCEDURE — 99232 SBSQ HOSP IP/OBS MODERATE 35: CPT | Performed by: INTERNAL MEDICINE

## 2020-08-05 PROCEDURE — 36573 INSJ PICC RS&I 5 YR+: CPT

## 2020-08-05 PROCEDURE — 6360000002 HC RX W HCPCS: Performed by: NURSE PRACTITIONER

## 2020-08-05 RX ORDER — OXYCODONE HYDROCHLORIDE AND ACETAMINOPHEN 5; 325 MG/1; MG/1
1 TABLET ORAL EVERY 6 HOURS PRN
Qty: 12 TABLET | Refills: 0 | Status: SHIPPED | OUTPATIENT
Start: 2020-08-05 | End: 2020-08-08

## 2020-08-05 RX ORDER — SODIUM CHLORIDE 0.9 % (FLUSH) 0.9 %
10 SYRINGE (ML) INJECTION PRN
Status: DISCONTINUED | OUTPATIENT
Start: 2020-08-05 | End: 2020-08-05 | Stop reason: HOSPADM

## 2020-08-05 RX ORDER — OXYCODONE HYDROCHLORIDE 5 MG/1
5 TABLET ORAL EVERY 6 HOURS PRN
Qty: 28 TABLET | Refills: 0 | Status: SHIPPED | OUTPATIENT
Start: 2020-08-05 | End: 2020-08-05 | Stop reason: CLARIF

## 2020-08-05 RX ORDER — OXYCODONE HYDROCHLORIDE AND ACETAMINOPHEN 5; 325 MG/1; MG/1
1 TABLET ORAL EVERY 6 HOURS PRN
Qty: 28 TABLET | Refills: 0 | Status: SHIPPED | OUTPATIENT
Start: 2020-08-05 | End: 2020-08-05

## 2020-08-05 RX ORDER — SODIUM CHLORIDE 9 MG/ML
250 INJECTION, SOLUTION INTRAVENOUS ONCE
Status: COMPLETED | OUTPATIENT
Start: 2020-08-05 | End: 2020-08-05

## 2020-08-05 RX ORDER — SODIUM CHLORIDE 0.9 % (FLUSH) 0.9 %
10 SYRINGE (ML) INJECTION EVERY 12 HOURS SCHEDULED
Status: DISCONTINUED | OUTPATIENT
Start: 2020-08-05 | End: 2020-08-05 | Stop reason: HOSPADM

## 2020-08-05 RX ORDER — LIDOCAINE HYDROCHLORIDE 20 MG/ML
5 INJECTION, SOLUTION INFILTRATION; PERINEURAL ONCE
Status: COMPLETED | OUTPATIENT
Start: 2020-08-05 | End: 2020-08-05

## 2020-08-05 RX ORDER — OXYCODONE HYDROCHLORIDE AND ACETAMINOPHEN 5; 325 MG/1; MG/1
1 TABLET ORAL EVERY 6 HOURS PRN
Qty: 28 TABLET | Refills: 0 | Status: SHIPPED | OUTPATIENT
Start: 2020-08-05 | End: 2020-08-05 | Stop reason: CLARIF

## 2020-08-05 RX ADMIN — VANCOMYCIN HYDROCHLORIDE 1250 MG: 5 INJECTION, POWDER, LYOPHILIZED, FOR SOLUTION INTRAVENOUS at 17:09

## 2020-08-05 RX ADMIN — FOLIC ACID 1 MG: 1 TABLET ORAL at 08:55

## 2020-08-05 RX ADMIN — MORPHINE SULFATE 2 MG: 2 INJECTION, SOLUTION INTRAMUSCULAR; INTRAVENOUS at 00:43

## 2020-08-05 RX ADMIN — VANCOMYCIN HYDROCHLORIDE 1250 MG: 5 INJECTION, POWDER, LYOPHILIZED, FOR SOLUTION INTRAVENOUS at 06:32

## 2020-08-05 RX ADMIN — MORPHINE SULFATE 2 MG: 2 INJECTION, SOLUTION INTRAMUSCULAR; INTRAVENOUS at 08:58

## 2020-08-05 RX ADMIN — MORPHINE SULFATE 2 MG: 2 INJECTION, SOLUTION INTRAMUSCULAR; INTRAVENOUS at 06:38

## 2020-08-05 RX ADMIN — MORPHINE SULFATE 2 MG: 2 INJECTION, SOLUTION INTRAMUSCULAR; INTRAVENOUS at 18:27

## 2020-08-05 RX ADMIN — MORPHINE SULFATE 2 MG: 2 INJECTION, SOLUTION INTRAMUSCULAR; INTRAVENOUS at 12:22

## 2020-08-05 RX ADMIN — SODIUM CHLORIDE 250 ML: 9 INJECTION, SOLUTION INTRAVENOUS at 16:38

## 2020-08-05 RX ADMIN — Medication 100 MG: at 08:56

## 2020-08-05 RX ADMIN — LIDOCAINE HYDROCHLORIDE 5 ML: 20 INJECTION, SOLUTION INFILTRATION; PERINEURAL at 16:39

## 2020-08-05 RX ADMIN — MORPHINE SULFATE 2 MG: 2 INJECTION, SOLUTION INTRAMUSCULAR; INTRAVENOUS at 16:23

## 2020-08-05 ASSESSMENT — PAIN SCALES - GENERAL
PAINLEVEL_OUTOF10: 7
PAINLEVEL_OUTOF10: 0
PAINLEVEL_OUTOF10: 5
PAINLEVEL_OUTOF10: 7

## 2020-08-05 ASSESSMENT — ENCOUNTER SYMPTOMS
DIARRHEA: 0
SHORTNESS OF BREATH: 0
COUGH: 0
COLOR CHANGE: 1
RESPIRATORY NEGATIVE: 1

## 2020-08-05 NOTE — PROGRESS NOTES
Spoke with Dr. Barbara Whiteside over the phone.  Okay to be discharged and follow up in wound clinic tomorrow at 10 am.

## 2020-08-05 NOTE — PROGRESS NOTES
Pt assessment complete. Pt alert and oriented. Pt complains of 7/10 pain in left hand/finger. Medicated with 2mg morphine as ordered. Dressing on left hand clean, dry, intact. Pt resting in bed with eyes closed. Dr. Ronny Nevarez into see patient. Will continue to monitor.

## 2020-08-05 NOTE — CARE COORDINATION
IV BENEFIT REQUEST FORM    [facility]                        [address] [facility]    REQUESTED BY: Electronically signed by Mike Hood RN on 2020 at 2:20 PM                                               RN/C3: PHONE: [phone])     DATE:/TIME OF REQUEST: 20  TIME: 2:20 PM      TO: Nidia Bellekristian [address] TO: [phone]    PHONE: [phone]     THIS PATIENT HAS BEEN IDENTIFIED TO POSSIBLY NEED LONG TERM IV'S. PLEASE CHECK INSURANCE COVERAGE FOR THE FOLLOWING PT/DRUGS. PATIENT'S NAME: Sander Nieto                              ROOM: [place] [address]   PATIENT'S : 1975  PATIENT AD[address]ESS: [address] [place] [address]  SSN:    ()     PAYOR NAME:  Payor: Candace Jack / Plan: Sujey Plascencia / Product Type: *No Product type* /     DRUG: VANCOMYCIN                     DOSE: 1250 MG   FREQUENCY: Q 12 HR      __________ CHECK HERE IF PT HAS NO INSURANCE AND REQUESTING SELF PAY COST. *IF [facility] IS NOT A PROVIDER FOR THIS PATIENT, PLEASE FORWARD INFO VIA FAX TO [facility] @ [phone],(PHONE NUMBER: [phone]) TO RUN BENEFIT VERIFICATION AND NOTIFY THE ABOVE C3 OF THIS PLAN. (FAX FACE SHEET WITH DEMOGRAPHICS AND INSURANCE INFO WITH THIS FORM.)  PLEASE FAX BENEFIT INFO TO: THE [phone]    This message is intended only for the use of the individual or entity to which it is addressed and may contain information that is privileged, confidential, and exempt from disclosure under applicable law. If the reader of the notice is not intended recipient of the employee/agent responsible for delivering the message to the intended recipient, you are hereby notified than any dissemination, distribution or copying of this communication is strictly prohibited.  Please contact the sender for further instructions on handling the information.

## 2020-08-05 NOTE — CARE COORDINATION
Met with pt who stated he stays with a shiva. CMI unable to be completed because pt became very agitated with questions. LSW shared probable need for I.V antibiotics at [facility] and options of HHC, Outpatient Infusion if 1-2x per day and SNF. Pt stated he just wanted to be set up with outpt infusion right now so he could be discharged. He stated he didn't want a PICC line either and they could \"poke\" him every time. Pt stated if he was not discharged by 6 pm then he would leave [facility]. Wound care at [facility] is also pending.

## 2020-08-05 NOTE — PROGRESS NOTES
Pt down for PICC, okay for patient to receive evening dose of vanco early. Pt will go to er registration at 6 am tomorrow for antibiotic infusion. Will follow up at 10 am in wound clinic with Dr. Desmond Byrd also tomorrow. Discharge order put in by Yuan JOVEL. Order already sent to [facility] on [address] for percocet perscription. Reconcile needed to be complete in order to print AVS. Sylvia Gaston supervisor aware.

## 2020-08-05 NOTE — PROGRESS NOTES
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 Respiratory: Negative. Cardiovascular: Negative. Skin: Positive for color change and wound. Physical Exam   Neck: Neck supple. Cardiovascular: Normal heart sounds. No murmur heard. Pulmonary/Chest: Breath sounds normal. No respiratory distress. He has no wheezes. He has no rales. He exhibits no tenderness. Abdominal: Soft. Bowel sounds are normal. He exhibits no distension. There is no abdominal tenderness. There is no rebound and no guarding. Hand wrapped    Blood pressure 127/71, pulse 74, temperature 97.9 °F (36.6 °C), temperature source Oral, resp. rate 17, height 5' 9\" (1.753 m), weight 180 lb (81.6 kg), SpO2 95 %.       .   Lab Results   Component Value Date    WBC 11.7 (H) 08/03/2020    HGB 16.3 08/03/2020    HCT 48.2 08/03/2020    MCV 95.7 08/03/2020     08/03/2020     Lab Results   Component Value Date     08/03/2020    K 3.3 08/03/2020     08/03/2020    CO2 25 08/03/2020    BUN 7 08/03/2020    CREATININE 0.82 08/03/2020    GLUCOSE 119 08/03/2020    CALCIUM 9.0 08/03/2020        Culture, Anaerobic and Aerobic [[id number]]  (Abnormal)  Collected: 08/03/20 1101    Order Status: Completed  Specimen: Finger  Updated: 08/04/20 1022     Gram Stain Result  Few WBC's   Rare epithelial cells   Moderate Gram positive cocci in clusters-resembling Staph   Abnormal       Anaerobic Culture  Culture in progress     Organism  Staph aureus MRSAAbnormal       WOUND/ABSCESS  --Abnormal       Heavy growth   Sensitivity to follow   CONTACT PRECAUTIONS              ASSESSMENT:  Patient Active Problem List   Diagnosis    Tenosynovitis of finger         PLAN:    Tenosynovitis abscess  left fourth finger      2 weeks IV vancomycin

## 2020-08-05 NOTE — PROGRESS NOTES
Pharmacy Vancomycin Consult     Vancomycin Day: 3  Current Dosing: Vanco 1250mg IV q12h    Temp max:  98.1 F    Recent Labs     08/03/20  1045   BUN 7       Recent Labs     08/03/20  1045   CREATININE 0.82       Recent Labs     08/03/20  1045   WBC 11.7*         Intake/Output Summary (Last 24 hours) at 8/5/2020 1053  Last data filed at 8/5/2020 9488  Gross per 24 hour   Intake 1526.28 ml   Output 5 ml   Net 1521.28 ml       Culture Date      Source                       Results  Specimen Source: Finger      Gram Stain Result  Few WBC's   Rare epithelial cells   Moderate Gram positive cocci in clusters-resembling Staph   Abnormal       Anaerobic Culture  No Anaerobes Isolated     Organism  Staph aureus MRSAAbnormal       WOUND/ABSCESS  --Abnormal       Heavy growth   CONTACT PRECAUTIONS INDICATED   PBP2= POSITIVE   Abnormal        Sensitive to vancomycin        Ht Readings from Last 1 Encounters:   08/03/20 5' 9\" (1.753 m)        Wt Readings from Last 1 Encounters:   08/03/20 180 lb (81.6 kg)         Body mass index is 26.58 kg/m². Estimated Creatinine Clearance: 113 mL/min (based on SCr of 0.82 mg/dL). Trough: 13.3 0652 8/5/20     Assessment/Plan:  Trough therapeutic for goal 10-20. Will continue current dosing. Will continue to monitor renal fxn, lab results, and clinical response. Recommend repeat trough in 3 days if no changes in above.

## 2020-08-05 NOTE — PROGRESS NOTES
Progress Note  Patient: Maximo Martinez  Unit/Bed:  O091/T269-40  YOB: 1975  MRN: [id number]  Acct: [de-identified]   Admitting Diagnosis: Tenosynovitis of finger [M65.9]  Admit Date:  8/3/2020  Hospital Day: 2    Chief Complaint:  Left ring finger pain, edema, erythema and purulent drainage from a wound infection from wood splinters. He notes that he is experiencing numbness in the left ring finger and it was explained to him that he had a nerve block for the procedure and this is to be expected. Subjective  Patient notes numbness and some pressure however has minimal pain and is ready to discharge home.      Review of Systems:   Review of Systems      Physical Examination:    BP (!) 121/58   Pulse 67   Temp 98.1 °F (36.7 °C) (Oral)   Resp 16   Ht 5' 9\" (1.753 m)   Wt 180 lb (81.6 kg)   SpO2 95%   BMI 26.58 kg/m²    Physical Exam    LABS:  CBC:   Lab Results   Component Value Date    WBC 11.7 08/03/2020    RBC 5.04 08/03/2020    HGB 16.3 08/03/2020    HCT 48.2 08/03/2020    MCV 95.7 08/03/2020    MCH 32.3 08/03/2020    MCHC 33.8 08/03/2020    RDW 14.0 08/03/2020     08/03/2020     CBC with Differential:   Lab Results   Component Value Date    WBC 11.7 08/03/2020    RBC 5.04 08/03/2020    HGB 16.3 08/03/2020    HCT 48.2 08/03/2020     08/03/2020    MCV 95.7 08/03/2020    MCH 32.3 08/03/2020    MCHC 33.8 08/03/2020    RDW 14.0 08/03/2020    LYMPHOPCT 9.9 08/03/2020    MONOPCT 8.1 08/03/2020    BASOPCT 0.5 08/03/2020    MONOSABS 0.9 08/03/2020    LYMPHSABS 1.2 08/03/2020    EOSABS 0.2 08/03/2020    BASOSABS 0.1 08/03/2020     CMP:    Lab Results   Component Value Date     08/03/2020    K 3.3 08/03/2020     08/03/2020    CO2 25 08/03/2020    BUN 7 08/03/2020    CREATININE 0.82 08/03/2020    GFRAA >60.0 08/03/2020    LABGLOM >60.0 08/03/2020    GLUCOSE 119 08/03/2020    PROT 7.0 08/03/2020    LABALBU 4.1 08/03/2020    CALCIUM 9.0 08/03/2020    BILITOT 0.7 08/03/2020

## 2020-08-05 NOTE — DISCHARGE INSTR - COC
m)   Wt 180 lb (81.6 kg)   SpO2 95%   BMI 26.58 kg/m²     Last documented pain score (0-10 scale): Pain Level: 7  Last Weight:   Wt Readings from Last 1 Encounters:   20 180 lb (81.6 kg)     Mental Status:  {IP PT MENTAL STATUS:}    IV Access:  { SARAH IV ACCESS:012842365}    Nursing Mobility/ADLs:  Walking   {CHP DME YHNQ:079296855}  Transfer  {CHP DME WBAE:153514264}  Bathing  {CHP DME TKNY:140019724}  Dressing  {CHP DME DKTX:656033625}  Toileting  {CHP DME MZOJ:964081055}  Feeding  {CHP DME WHWJ:429494071}  Med Admin  {CHP DME GRSS:011610460}  Med Delivery   { SARAH MED Delivery:507278820}    Wound Care Documentation and Therapy:        Elimination:  Continence:   · Bowel: {YES / LB:46087}  · Bladder: {YES / WF:39807}  Urinary Catheter: {Urinary Catheter:056370192}   Colostomy/Ileostomy/Ileal Conduit: {YES / GK:92080}       Date of Last BM: ***    Intake/Output Summary (Last 24 hours) at 2020 1751  Last data filed at 2020 0938  Gross per 24 hour   Intake 1526.28 ml   Output --   Net 1526.28 ml     I/O last 3 completed shifts: In: 1526.3 [P.O.:720;  I.V.:806.3]  Out: -     Safety Concerns:     [address] Safety Concerns:471609006}    Impairments/Disabilities:      [address] Impairments/Disabilities:101078920}    Nutrition Therapy:  Current Nutrition Therapy:   [address] Diet List:414229441}    Routes of Feeding: {P DME Other Feedings:763265261}  Liquids: {Slp liquid thickness:33244}  Daily Fluid Restriction: {CHP DME Yes amt example:238517401}  Last Modified Barium Swallow with Video (Video Swallowing Test): {Done Not Done BRGM:078270027}    Treatments at the Time of Hospital Discharge:   Respiratory Treatments: ***  Oxygen Therapy:  {Therapy; copd oxygen:38543}  Ventilator:    {ERICH MTZ Vent ZDLE:772380154}    Rehab Therapies: {THERAPEUTIC INTERVENTION:5492660334}  Weight Bearing Status/Restrictions: {ERICH MTZ Weight Bearin}  Other Medical Equipment (for information only, NOT a DME order): {EQUIPMENT:570219017}  Other Treatments: ***    Patient's personal belongings (please select all that are sent with patient):  {CHP DME Belongings:445607106}    RN SIGNATURE:  {Esignature:507038620}    CASE MANAGEMENT/SOCIAL WORK SECTION    Inpatient Status Date: ***    Readmission Risk Assessment Score:  Readmission Risk              Risk of Unplanned Readmission:        7           Discharging to Facility/ Agency   · Name:   · Address:  · Phone:  · Fax:    Dialysis Facility (if applicable)   · Name:  · Address:  · Dialysis Schedule:  · Phone:  · Fax:    / signature: {Esignature:822884133}    PHYSICIAN SECTION    Prognosis: {Prognosis:0632993047}    Condition at Discharge: [address] [place] [address] Patient Condition:779194102}    Rehab Potential (if transferring to Rehab): {Prognosis:8635887004}    Recommended Labs or Other Treatments After Discharge: ***    Physician Certification: I certify the above information and transfer of Gale Cuadra  is necessary for the continuing treatment of the diagnosis listed and that he requires {Admit to Appropriate Level of Care:18210} for {GREATER/LESS:751032207} 30 days.      Update Admission H&P: {CHP DME Changes in WJDWS:824063549}    PHYSICIAN SIGNATURE:  {Esignature:320427394}

## 2020-08-05 NOTE — PROGRESS NOTES
Tamela Baugh is a 53 y.o. male patient.   S/p surgery, denies any needs, wants to be discharged as soon as possible  Current Facility-Administered Medications   Medication Dose Route Frequency Provider Last Rate Last Dose    0.9 % sodium chloride infusion  50 mL/hr Intravenous Continuous Ashok Hidalgo MD        sodium chloride flush 0.9 % injection 10 mL  10 mL Intravenous 2 times per day Ashok Hidalgo MD        sodium chloride flush 0.9 % injection 10 mL  10 mL Intravenous PRN Ashok Hidalgo MD        acetaminophen (TYLENOL) tablet 650 mg  650 mg Oral Q4H PRN Ashok Hidalgo MD        morphine (PF) injection 2 mg  2 mg Intravenous Q2H PRN Ashok Hidalgo MD   2 mg at 08/04/20 1632    Or    morphine injection 4 mg  4 mg Intravenous Q2H PRN Ashok Hidalgo MD   4 mg at 08/04/20 1824    aluminum & magnesium hydroxide-simethicone (MAALOX) 200-200-20 MG/5ML suspension 15 mL  15 mL Oral Q6H PRN Ashok Hidalgo MD        promethazine (PHENERGAN) tablet 12.5 mg  12.5 mg Oral Q6H PRN Ashok Hidalgo MD        Or    ondansetron [facility]) injection 4 mg  4 mg Intravenous Q6H PRN Ashok Hidalgo MD        oxyCODONE (ROXICODONE) immediate release tablet 5 mg  5 mg Oral Q6H PRN Elnor Yosef, APRN - CNP   5 mg at 08/03/20 1550    morphine (PF) injection 2 mg  2 mg Intravenous Q2H PRN Elnor Yosef, APRN - CNP   2 mg at 08/05/20 0858    magnesium hydroxide (MILK OF MAGNESIA) 400 MG/5ML suspension 30 mL  30 mL Oral Daily PRN Elnor Yosef, APRN - CNP        promethazine (PHENERGAN) tablet 12.5 mg  12.5 mg Oral Q6H PRN Elnor Yosef, APRN - CNP        Or    ondansetron (ZOFRAN) injection 4 mg  4 mg Intravenous Q6H PRN Elnor Yosef, APRN - CNP        vitamin B-1 (THIAMINE) tablet 100 mg  100 mg Oral Daily Kunal Schreiber MD   100 mg at 40/79/45 0610    folic acid (FOLVITE) tablet 1 mg  1 mg Oral Daily Kunal Schreiber MD   1 mg at 08/05/20 0855    vancomycin (VANCOCIN) intermittent dosing (placeholder)   Other RX Placeholder Lino Mcnamara MD        vancomycin (VANCOCIN) 1,250 mg in dextrose 5 % 250 mL IVPB  1,250 mg Intravenous Q12H Lino Mcnamara MD   Stopped at 08/05/20 0933     No Known Allergies  Active Problems:    Tenosynovitis of finger  Resolved Problems:    * No resolved hospital problems. *    Blood pressure (!) 121/58, pulse 67, temperature 98.1 °F (36.7 °C), temperature source Oral, resp. rate 16, height 5' 9\" (1.753 m), weight 180 lb (81.6 kg), SpO2 95 %. Subjective:  Symptoms:  No shortness of breath, malaise, cough, chest pain, weakness, headache, chest pressure, anorexia, diarrhea or anxiety. Objective:  General Appearance:  Comfortable and well-appearing. Vital signs: (most recent): Blood pressure (!) 121/58, pulse 67, temperature 98.1 °F (36.7 °C), temperature source Oral, resp. rate 16, height 5' 9\" (1.753 m), weight 180 lb (81.6 kg), SpO2 95 %. HEENT: Normal HEENT exam.    Lungs:  Normal effort. Heart: Normal rate. Regular rhythm. S1 normal and S2 normal.    Abdomen: Abdomen is soft. There is no epigastric area or suprapubic area tenderness. Neurological: Patient is alert and oriented to person, place and time. Pupils:  Pupils are equal, round, and reactive to light. Skin:  Warm and dry.       Assessment & Plan    1) Tenosynovitis  C/w IV abx  MRSA posiitve  Recommend po abx for 10 days  2) tobacco use counseling given  3) etoh abuse  Monitor for withdrawal  4) DVT ppx  Concetta Munoz MD  8/5/2020

## 2020-08-05 NOTE — PROGRESS NOTES
Pt shift assessment was completed. Clear lung sounds and active bowel sounds. Skin is clean dry. The dressing to his left ring finger is also clean dry and intact. Pt is tolerating diet very well, and ambulating in his room. He is still receiving morphine q2h prn for pain when he needs it. No further requests at this time.

## 2020-08-05 NOTE — PROGRESS NOTES
Occupational Therapy  Facility/Department: [facility] U504/Z807-03  Interdisciplinary Communication Note      To the referring provider,     While we thank you for your referral, Gennaro Almanza was not seen for an evaluation as: The patient was observed as IND in the room and does not require skilled services. Discussed referral option for OP OT if there are concerns with L hand and digit use once splint is removed and pt begins to use the digit functionally. Thank you,    Electronically signed by INOCENTE Newell/L on 8/5/20 at 10:37 AM EDT    [facility] EMERGENCY [facility] AT [place] O[facility] Department.

## 2020-08-06 ENCOUNTER — HOSPITAL ENCOUNTER (OUTPATIENT)
Dept: INFUSION THERAPY | Age: 45
Setting detail: INFUSION SERIES
Discharge: HOME | End: 2020-08-06
Payer: COMMERCIAL

## 2020-08-06 DIAGNOSIS — M65.9 TENOSYNOVITIS OF FINGER: Primary | ICD-10-CM

## 2020-08-06 LAB
ANAEROBIC CULTURE: ABNORMAL
CULTURE SURGICAL: ABNORMAL
GRAM STAIN RESULT: ABNORMAL
ORGANISM: ABNORMAL

## 2020-08-06 PROCEDURE — 6360000002 HC RX W HCPCS: Performed by: INTERNAL MEDICINE

## 2020-08-06 PROCEDURE — 2580000003 HC RX 258: Performed by: INTERNAL MEDICINE

## 2020-08-06 PROCEDURE — 96366 THER/PROPH/DIAG IV INF ADDON: CPT

## 2020-08-06 PROCEDURE — 96365 THER/PROPH/DIAG IV INF INIT: CPT

## 2020-08-06 RX ADMIN — VANCOMYCIN HYDROCHLORIDE 1000 MG: 1 INJECTION, POWDER, LYOPHILIZED, FOR SOLUTION INTRAVENOUS at 18:13

## 2020-08-06 RX ADMIN — VANCOMYCIN HYDROCHLORIDE 1000 MG: 1 INJECTION, POWDER, LYOPHILIZED, FOR SOLUTION INTRAVENOUS at 08:23

## 2020-08-06 NOTE — DISCHARGE SUMMARY
[facility]    Orthopedic care:        Principal Diagnosis:  tenosynovitis    Procedure: I&D washout    Activity:  -Encourage hand  range of motions, lightly squeezing tennis ball or making a fist with the hand you had operated on   -do not lift heavy objects for the next week    Diet:  -Regular diet.  -Take protein supplement (e.g., Boost, Ensure, [place] Instant Breakfast if not diabetic; Glucerna if diabetic) 1 serving by mouth 3 times daily to promote incision healing and resolution of swelling. Dressing Care:  -keep ACE wrap on until your follow up appointment with us  -do not get dressing wet    Medications:  -motrin, 800 mg three times daily with food, in combination with tylenol, 1000 mg three times daily, for first line pain  -if pain persists, take 1 tablet of Norco every 6 hours as you need.  If you can, please refrain from doing such as they are highly addictive    Additional Instructions:  -Ice packs to right hip and thigh for 15-20 minutes every 4 hours can also help with pain  -keep arm elevated if you feel that it is swollen    Follow-up:  -Will need to follow up in wound care appointment scheduled for tomorrow at 10 am.             [facility]    [address] [facility]  [address], [address]

## 2020-08-07 ENCOUNTER — HOSPITAL ENCOUNTER (OUTPATIENT)
Dept: INFUSION THERAPY | Age: 45
Setting detail: INFUSION SERIES
Discharge: HOME | End: 2020-08-07
Payer: COMMERCIAL

## 2020-08-07 VITALS
HEART RATE: 81 BPM | DIASTOLIC BLOOD PRESSURE: 80 MMHG | TEMPERATURE: 97.1 F | SYSTOLIC BLOOD PRESSURE: 111 MMHG | RESPIRATION RATE: 18 BRPM

## 2020-08-07 DIAGNOSIS — M65.9 TENOSYNOVITIS OF FINGER: Primary | ICD-10-CM

## 2020-08-07 LAB
C. TRACHOMATIS DNA ,URINE: NEGATIVE
N. GONORRHOEAE DNA, URINE: POSITIVE

## 2020-08-07 PROCEDURE — 2580000003 HC RX 258: Performed by: INTERNAL MEDICINE

## 2020-08-07 PROCEDURE — 96365 THER/PROPH/DIAG IV INF INIT: CPT

## 2020-08-07 PROCEDURE — 6360000002 HC RX W HCPCS: Performed by: INTERNAL MEDICINE

## 2020-08-07 PROCEDURE — 99211 OFF/OP EST MAY X REQ PHY/QHP: CPT

## 2020-08-07 PROCEDURE — 96366 THER/PROPH/DIAG IV INF ADDON: CPT

## 2020-08-07 PROCEDURE — 2580000003 HC RX 258

## 2020-08-07 RX ORDER — MAGNESIUM HYDROXIDE 1200 MG/15ML
LIQUID ORAL
Status: COMPLETED
Start: 2020-08-07 | End: 2020-08-07

## 2020-08-07 RX ADMIN — VANCOMYCIN 1000 MG: 1 INJECTION, SOLUTION INTRAVENOUS at 09:49

## 2020-08-07 RX ADMIN — WATER 1000 ML: 1 IRRIGANT IRRIGATION at 11:44

## 2020-08-07 NOTE — FLOWSHEET NOTE
Patient to the floor ambulatory for his iv vancomycin infusion. Vital signs taken. Complains of left 2nd finger discomfort rated a 5. Will call Dr. Chapman Risk office to check if we can change the dressing. Patient wants us to remove the packing. Call light within reach. No distress is noted.

## 2020-08-07 NOTE — FLOWSHEET NOTE
2nd attempt to call office. Spoke to Dr Job Bauer and he instructed in care of the wound. Patient is to remove the packing and soak the finger in 1/2  Peroxide and water for a few minuted. Then cover with a bandaid. He will need to follow up at the [facility] wound center on Monday at 1030 am. Patient is aware.

## 2020-08-07 NOTE — FLOWSHEET NOTE
Message left at Dr. Del Real [place] office for further orders for his left finger dressing change.

## 2020-08-07 NOTE — FLOWSHEET NOTE
Soak is complete. White tissue noted with 3 stitched intact. No drainage noted. Patient wanted the finger wrapped. telfa dressing placed and wrapped with cassie. Patient tolerated well. AVS printed and given to patient. Re-instructed about follow up with Dr. Socorro Armas on Monday at [phone] at [facility] wound center. Phone # and directions given. The are also aware that he is to be scheduled. Left the unit ambulatory. All equipment used in the care for this patient has been cleaned.

## 2020-08-08 ENCOUNTER — HOSPITAL ENCOUNTER (OUTPATIENT)
Dept: INFUSION THERAPY | Age: 45
Setting detail: INFUSION SERIES
Discharge: HOME | End: 2020-08-08
Payer: COMMERCIAL

## 2020-08-08 VITALS
TEMPERATURE: 97.5 F | OXYGEN SATURATION: 97 % | HEART RATE: 83 BPM | DIASTOLIC BLOOD PRESSURE: 75 MMHG | SYSTOLIC BLOOD PRESSURE: 124 MMHG

## 2020-08-08 DIAGNOSIS — M65.9 TENOSYNOVITIS OF FINGER: Primary | ICD-10-CM

## 2020-08-08 LAB
BLOOD CULTURE, ROUTINE: NORMAL
CULTURE, BLOOD 2: NORMAL

## 2020-08-08 PROCEDURE — 96374 THER/PROPH/DIAG INJ IV PUSH: CPT

## 2020-08-08 PROCEDURE — 6360000002 HC RX W HCPCS: Performed by: INTERNAL MEDICINE

## 2020-08-08 PROCEDURE — 96365 THER/PROPH/DIAG IV INF INIT: CPT

## 2020-08-08 PROCEDURE — 2580000003 HC RX 258: Performed by: INTERNAL MEDICINE

## 2020-08-08 RX ADMIN — VANCOMYCIN HYDROCHLORIDE 1000 MG: 1 INJECTION, POWDER, LYOPHILIZED, FOR SOLUTION INTRAVENOUS at 09:20

## 2020-08-08 RX ADMIN — VANCOMYCIN HYDROCHLORIDE 1000 MG: 1 INJECTION, POWDER, LYOPHILIZED, FOR SOLUTION INTRAVENOUS at 20:44

## 2020-08-09 ENCOUNTER — HOSPITAL ENCOUNTER (OUTPATIENT)
Dept: INFUSION THERAPY | Age: 45
Setting detail: INFUSION SERIES
Discharge: HOME | End: 2020-08-09
Payer: COMMERCIAL

## 2020-08-09 DIAGNOSIS — M65.9 TENOSYNOVITIS OF FINGER: Primary | ICD-10-CM

## 2020-08-09 PROCEDURE — 96365 THER/PROPH/DIAG IV INF INIT: CPT

## 2020-08-09 PROCEDURE — 2580000003 HC RX 258: Performed by: INTERNAL MEDICINE

## 2020-08-09 PROCEDURE — 6360000002 HC RX W HCPCS: Performed by: INTERNAL MEDICINE

## 2020-08-09 RX ADMIN — VANCOMYCIN HYDROCHLORIDE 1000 MG: 1 INJECTION, POWDER, LYOPHILIZED, FOR SOLUTION INTRAVENOUS at 10:38

## 2020-08-10 ENCOUNTER — HOSPITAL ENCOUNTER (OUTPATIENT)
Dept: INFUSION THERAPY | Age: 45
Setting detail: INFUSION SERIES
Discharge: HOME | End: 2020-08-10
Payer: COMMERCIAL

## 2020-08-10 VITALS
RESPIRATION RATE: 16 BRPM | HEART RATE: 82 BPM | TEMPERATURE: 97 F | SYSTOLIC BLOOD PRESSURE: 113 MMHG | DIASTOLIC BLOOD PRESSURE: 73 MMHG

## 2020-08-10 DIAGNOSIS — M65.9 TENOSYNOVITIS OF FINGER: Primary | ICD-10-CM

## 2020-08-10 PROCEDURE — 96365 THER/PROPH/DIAG IV INF INIT: CPT

## 2020-08-10 PROCEDURE — 2580000003 HC RX 258: Performed by: INTERNAL MEDICINE

## 2020-08-10 RX ADMIN — DEXTROSE MONOHYDRATE 1000 MG: 5 INJECTION, SOLUTION INTRAVENOUS at 09:25

## 2020-08-10 ASSESSMENT — PAIN SCALES - GENERAL: PAINLEVEL_OUTOF10: 5

## 2020-08-10 NOTE — FLOWSHEET NOTE
Infusion complete, patient tolerated well, left unit via ambulation,all equipment cleaned after discharge

## 2020-08-11 ENCOUNTER — HOSPITAL ENCOUNTER (OUTPATIENT)
Dept: INFUSION THERAPY | Age: 45
Setting detail: INFUSION SERIES
Discharge: HOME | End: 2020-08-11
Payer: COMMERCIAL

## 2020-08-11 VITALS
RESPIRATION RATE: 16 BRPM | OXYGEN SATURATION: 97 % | TEMPERATURE: 97.7 F | DIASTOLIC BLOOD PRESSURE: 79 MMHG | HEART RATE: 80 BPM | SYSTOLIC BLOOD PRESSURE: 128 MMHG

## 2020-08-11 VITALS
SYSTOLIC BLOOD PRESSURE: 105 MMHG | DIASTOLIC BLOOD PRESSURE: 61 MMHG | RESPIRATION RATE: 18 BRPM | HEART RATE: 73 BPM | TEMPERATURE: 98 F

## 2020-08-11 DIAGNOSIS — M65.9 TENOSYNOVITIS OF FINGER: Primary | ICD-10-CM

## 2020-08-11 PROCEDURE — 6360000002 HC RX W HCPCS: Performed by: INTERNAL MEDICINE

## 2020-08-11 PROCEDURE — 96365 THER/PROPH/DIAG IV INF INIT: CPT

## 2020-08-11 PROCEDURE — 2580000003 HC RX 258: Performed by: INTERNAL MEDICINE

## 2020-08-11 RX ADMIN — VANCOMYCIN 1000 MG: 1 INJECTION, SOLUTION INTRAVENOUS at 09:15

## 2020-08-11 RX ADMIN — VANCOMYCIN HYDROCHLORIDE 1000 MG: 1 INJECTION, POWDER, LYOPHILIZED, FOR SOLUTION INTRAVENOUS at 20:09

## 2020-08-11 NOTE — FLOWSHEET NOTE
Infusion complete. Tolerated well. Left the unit ambulatory. All equipment used in the care for this patient has been cleaned.

## 2020-08-12 ENCOUNTER — HOSPITAL ENCOUNTER (OUTPATIENT)
Dept: INFUSION THERAPY | Age: 45
Setting detail: INFUSION SERIES
Discharge: HOME | End: 2020-08-12
Payer: COMMERCIAL

## 2020-08-12 VITALS
TEMPERATURE: 97.7 F | HEART RATE: 73 BPM | RESPIRATION RATE: 18 BRPM | SYSTOLIC BLOOD PRESSURE: 117 MMHG | DIASTOLIC BLOOD PRESSURE: 77 MMHG

## 2020-08-12 DIAGNOSIS — M65.9 TENOSYNOVITIS OF FINGER: Primary | ICD-10-CM

## 2020-08-12 LAB
ANION GAP SERPL CALCULATED.3IONS-SCNC: 10 MEQ/L (ref 9–15)
BUN BLDV-MCNC: 11 MG/DL (ref 6–20)
CALCIUM SERPL-MCNC: 9 MG/DL (ref 8.5–9.9)
CHLORIDE BLD-SCNC: 104 MEQ/L (ref 95–107)
CO2: 27 MEQ/L (ref 20–31)
CREAT SERPL-MCNC: 0.97 MG/DL (ref 0.7–1.2)
GFR AFRICAN AMERICAN: >60
GFR NON-AFRICAN AMERICAN: >60
GLUCOSE BLD-MCNC: 106 MG/DL (ref 70–99)
POTASSIUM SERPL-SCNC: 3.9 MEQ/L (ref 3.4–4.9)
SODIUM BLD-SCNC: 141 MEQ/L (ref 135–144)
VANCOMYCIN TROUGH: 7.4 UG/ML (ref 10–20)

## 2020-08-12 PROCEDURE — 2580000003 HC RX 258: Performed by: INTERNAL MEDICINE

## 2020-08-12 PROCEDURE — 80048 BASIC METABOLIC PNL TOTAL CA: CPT

## 2020-08-12 PROCEDURE — 36592 COLLECT BLOOD FROM PICC: CPT

## 2020-08-12 PROCEDURE — 96365 THER/PROPH/DIAG IV INF INIT: CPT

## 2020-08-12 PROCEDURE — 6360000002 HC RX W HCPCS: Performed by: INTERNAL MEDICINE

## 2020-08-12 PROCEDURE — 80202 ASSAY OF VANCOMYCIN: CPT

## 2020-08-12 RX ADMIN — VANCOMYCIN 1000 MG: 1 INJECTION, SOLUTION INTRAVENOUS at 08:31

## 2020-08-13 ENCOUNTER — HOSPITAL ENCOUNTER (OUTPATIENT)
Dept: INFUSION THERAPY | Age: 45
Setting detail: INFUSION SERIES
Discharge: HOME | End: 2020-08-13
Payer: COMMERCIAL

## 2020-08-13 VITALS
HEART RATE: 72 BPM | DIASTOLIC BLOOD PRESSURE: 80 MMHG | SYSTOLIC BLOOD PRESSURE: 114 MMHG | RESPIRATION RATE: 18 BRPM | TEMPERATURE: 97.8 F

## 2020-08-13 DIAGNOSIS — M65.9 TENOSYNOVITIS OF FINGER: Primary | ICD-10-CM

## 2020-08-13 PROCEDURE — 96365 THER/PROPH/DIAG IV INF INIT: CPT

## 2020-08-13 PROCEDURE — 6360000002 HC RX W HCPCS: Performed by: INTERNAL MEDICINE

## 2020-08-13 PROCEDURE — 2580000003 HC RX 258: Performed by: INTERNAL MEDICINE

## 2020-08-13 RX ADMIN — VANCOMYCIN HYDROCHLORIDE 1250 MG: 5 INJECTION, POWDER, LYOPHILIZED, FOR SOLUTION INTRAVENOUS at 08:42

## 2020-08-14 ENCOUNTER — TELEPHONE (OUTPATIENT)
Dept: INFECTIOUS DISEASES | Age: 45
End: 2020-08-14

## 2020-08-14 NOTE — TELEPHONE ENCOUNTER
Nurse Roslyn Kim states patient has not been compliant with appts for O/P Infusion. He has missed 2 evenings and a morning within the past few days, as of about 10:30 am he has not shown up for his Morning dose. He suppose to have IV Vanco 1.25 G, Q 12 hrs. With eot expected 8/19/20. His F/u is on 8/18/20 via virtual visit with Dr Brittni Davey. Will update Dr Brittni Davey.

## 2020-08-17 ENCOUNTER — TELEPHONE (OUTPATIENT)
Dept: INFECTIOUS DISEASES | Age: 45
End: 2020-08-17

## 2020-08-17 NOTE — TELEPHONE ENCOUNTER
Received call from [facility] Out/PT Infusion. States since Thursday (8/13-20 up to now patient did not show up for his Infusion. Patient on IV VAnco 1.25 g Q 12 hours EOT 8/19/20. He has F/U appt on 8/18/21-  I will up date

## 2020-08-18 ENCOUNTER — TELEPHONE (OUTPATIENT)
Dept: INFECTIOUS DISEASES | Age: 45
End: 2020-08-18

## 2020-08-18 NOTE — TELEPHONE ENCOUNTER
Recieved call from Mina Rubalcava( Out/Pt Infusion) States patient did not show up yesterda and Today for his IV Abx. I will update .

## 2020-08-19 ENCOUNTER — TELEPHONE (OUTPATIENT)
Dept: INFECTIOUS DISEASES | Age: 45
End: 2020-08-19

## 2020-08-19 NOTE — TELEPHONE ENCOUNTER
Called patient Number [email or web address] to return my call in the office to reschedule an appt. Then called Leanne spoke with her if patient can call me at the office.

## 2020-08-20 ENCOUNTER — HOSPITAL ENCOUNTER (EMERGENCY)
Age: 45
Discharge: HOME | End: 2020-08-20
Payer: COMMERCIAL

## 2020-08-20 ENCOUNTER — APPOINTMENT (OUTPATIENT)
Dept: GENERAL RADIOLOGY | Age: 45
End: 2020-08-20
Payer: COMMERCIAL

## 2020-08-20 ENCOUNTER — TELEPHONE (OUTPATIENT)
Dept: INFECTIOUS DISEASES | Age: 45
End: 2020-08-20

## 2020-08-20 VITALS
TEMPERATURE: 97.7 F | SYSTOLIC BLOOD PRESSURE: 134 MMHG | OXYGEN SATURATION: 97 % | DIASTOLIC BLOOD PRESSURE: 88 MMHG | HEART RATE: 80 BPM | WEIGHT: 180 LBS | BODY MASS INDEX: 26.58 KG/M2 | RESPIRATION RATE: 20 BRPM

## 2020-08-20 PROCEDURE — 96365 THER/PROPH/DIAG IV INF INIT: CPT

## 2020-08-20 PROCEDURE — 6360000002 HC RX W HCPCS: Performed by: NURSE PRACTITIONER

## 2020-08-20 PROCEDURE — 73130 X-RAY EXAM OF HAND: CPT

## 2020-08-20 PROCEDURE — 99281 EMR DPT VST MAYX REQ PHY/QHP: CPT

## 2020-08-20 PROCEDURE — 2580000003 HC RX 258: Performed by: NURSE PRACTITIONER

## 2020-08-20 RX ADMIN — VANCOMYCIN HYDROCHLORIDE 1000 MG: 1 INJECTION, POWDER, LYOPHILIZED, FOR SOLUTION INTRAVENOUS at 12:01

## 2020-08-20 ASSESSMENT — PAIN DESCRIPTION - LOCATION: LOCATION: FINGER (COMMENT WHICH ONE)

## 2020-08-20 ASSESSMENT — ENCOUNTER SYMPTOMS
SHORTNESS OF BREATH: 0
ABDOMINAL PAIN: 0
COUGH: 0
BACK PAIN: 0

## 2020-08-20 ASSESSMENT — PAIN SCALES - GENERAL: PAINLEVEL_OUTOF10: 6

## 2020-08-20 ASSESSMENT — PAIN DESCRIPTION - PAIN TYPE: TYPE: ACUTE PAIN

## 2020-08-20 ASSESSMENT — PAIN DESCRIPTION - ORIENTATION: ORIENTATION: LEFT

## 2020-08-20 NOTE — ED TRIAGE NOTES
Pt a&o x4. Left index finger appears red and swollen. No drainage noted at suture site. Site has yellow crusted areas. States pain 6/10. Moves finger without difficulty.

## 2020-08-20 NOTE — TELEPHONE ENCOUNTER
Patient showed up to the O'P Infusion dept now. He has not had a Tx since last Thurs. One week ago. Will update Dr Fabrizio Fallon for further orders.

## 2020-08-20 NOTE — ED PROVIDER NOTES
[address] [facility] ED  eMERGENCY dEPARTMENT eNCOUnter      Pt Name: Pal Haskins  MRN: [id number]  [place] 1975  Date of evaluation: 8/20/2020  Provider: ELADIA Parikh CNP      HISTORY OF PRESENT ILLNESS    Pal Haskins is a 53 y.o. male who presents to the Emergency Department with request to have his stitches removed from his L 4th digit. Patient had surgery on finger by Dr. Luke Juan about 2 weeks ago. He did not go to his F/U appointment to have them removed on Monday. Patient come to ER requesting they be removed now. Also, He has not been going to the infusion center for the last week to receive his IV ATB. Finger became slightly tender and erythemic over the last 2 days. No drainage. Pain is mild. REVIEW OF SYSTEMS       Review of Systems   Constitutional: Negative for activity change, appetite change and fever. HENT: Negative for congestion. Respiratory: Negative for cough and shortness of breath. Cardiovascular: Negative for chest pain. Gastrointestinal: Negative for abdominal pain. Genitourinary: Negative for dysuria. Musculoskeletal: Negative for arthralgias and back pain. Skin: Positive for wound (L 4th digit). Negative for rash. All other systems reviewed and are negative. PAST MEDICAL HISTORY     Past Medical History:   Diagnosis Date    Cancer [facility])     history of testicular          SURGICAL HISTORY       Past Surgical History:   Procedure Laterality Date    BACK SURGERY      HAND SURGERY Left 8/4/2020    WASHOUT I&D LEFT RING FINGER ROOM performed by Genesis Krishnan MD at [address] [place] [address]           CURRENT MEDICATIONS       Previous Medications    No medications on file       ALLERGIES     Patient has no known allergies. FAMILY HISTORY     History reviewed. No pertinent family history.        SOCIAL HISTORY       Social History     Socioeconomic History    Marital status:

## 2020-08-20 NOTE — PROGRESS NOTES
Patient arrived at outpatient infusion without appointment. Completion date had been 8/19/20 for IV antibiotics. Patient had not come in for any infusion appointments from 8/13 pm until now. Doctor had been made aware, multiple messages had been left with patient to continue treatments during this time. Patient now is here requesting treatment and requesting suture removal from left finger, Patient states that he missed his wound care appointment with Dr Zaida Hernandez at [facility] wound center last Monday which was his appointment to have sutures removed. Observed area of concern to be red, swollen with scan purulent drainage. Call placed to [place] wound center who stated that patient can not be seen until next Monday at 115pm. Patient advised to be seen in Emergency room.

## 2020-08-21 ENCOUNTER — HOSPITAL ENCOUNTER (OUTPATIENT)
Dept: INFUSION THERAPY | Age: 45
Setting detail: INFUSION SERIES
Discharge: HOME | DRG: 314 | End: 2020-08-21
Payer: COMMERCIAL

## 2020-08-21 VITALS
TEMPERATURE: 97.3 F | SYSTOLIC BLOOD PRESSURE: 126 MMHG | HEART RATE: 78 BPM | RESPIRATION RATE: 18 BRPM | DIASTOLIC BLOOD PRESSURE: 78 MMHG

## 2020-08-21 DIAGNOSIS — M65.9 TENOSYNOVITIS OF FINGER: Primary | ICD-10-CM

## 2020-08-21 PROCEDURE — 96366 THER/PROPH/DIAG IV INF ADDON: CPT

## 2020-08-21 PROCEDURE — 2580000003 HC RX 258: Performed by: INTERNAL MEDICINE

## 2020-08-21 PROCEDURE — 6360000002 HC RX W HCPCS: Performed by: INTERNAL MEDICINE

## 2020-08-21 PROCEDURE — 96365 THER/PROPH/DIAG IV INF INIT: CPT

## 2020-08-21 RX ADMIN — VANCOMYCIN HYDROCHLORIDE 1250 MG: 5 INJECTION, POWDER, LYOPHILIZED, FOR SOLUTION INTRAVENOUS at 08:51

## 2020-08-21 NOTE — FLOWSHEET NOTE
DR Cindy Martins requested that patient schedule follow up appointment as soon as possible. Discussed with patient. Provided patient with Dr Kira Irvin phone number and encourage him to schedule appointment as soon as possible.

## 2020-08-21 NOTE — FLOWSHEET NOTE
Infusion complete, patient tolerated well. Reminded to schedule appointment with Dr Celia Villatoro, reminded of scheduled weekend infusions. Patient verbalized understanding. Left unit via ambulation, all equipment cleaned after discharge.

## 2020-08-21 NOTE — FLOWSHEET NOTE
Patient had arrived on unit yesterday without appointment after missing a seven day course of treatments. His original complete date had been 8/19/2020. Patient sent to ER due to concern for appearance of his finger, wound center was contacted but was unable to see him until Monday 8/24 1315. Patient was treated and released from ER. Dr Ray Pham was notified. Patient was prescribed 7 more days of BID treatments via telephone order to start at 8pm 8/20/20. Discussed plan with patient prior to him leaving the hospital yesterday afternoon. Patient did not arrive for appointment last night. Reviewed importance of not missing antibiotic doses with patient. Patient verbalized knowledge and understanding. Patient provided with educational handout as well.

## 2020-08-23 ENCOUNTER — HOSPITAL ENCOUNTER (EMERGENCY)
Age: 45
Discharge: HOME | DRG: 314 | End: 2020-08-23
Payer: COMMERCIAL

## 2020-08-23 VITALS
HEIGHT: 69 IN | TEMPERATURE: 97.9 F | DIASTOLIC BLOOD PRESSURE: 87 MMHG | OXYGEN SATURATION: 96 % | SYSTOLIC BLOOD PRESSURE: 144 MMHG | WEIGHT: 180 LBS | RESPIRATION RATE: 19 BRPM | BODY MASS INDEX: 26.66 KG/M2 | HEART RATE: 100 BPM

## 2020-08-23 LAB
ALBUMIN SERPL-MCNC: 4.1 G/DL (ref 3.5–4.6)
ALP BLD-CCNC: 72 U/L (ref 35–104)
ALT SERPL-CCNC: 21 U/L (ref 0–41)
AMPHETAMINE SCREEN, URINE: ABNORMAL
ANION GAP SERPL CALCULATED.3IONS-SCNC: 8 MEQ/L (ref 9–15)
AST SERPL-CCNC: 21 U/L (ref 0–40)
BARBITURATE SCREEN URINE: ABNORMAL
BASOPHILS ABSOLUTE: 0.1 K/UL (ref 0–0.2)
BASOPHILS RELATIVE PERCENT: 0.9 %
BENZODIAZEPINE SCREEN, URINE: ABNORMAL
BILIRUB SERPL-MCNC: 1.4 MG/DL (ref 0.2–0.7)
BUN BLDV-MCNC: 9 MG/DL (ref 6–20)
C-REACTIVE PROTEIN: 149 MG/L (ref 0–5)
CALCIUM SERPL-MCNC: 8.8 MG/DL (ref 8.5–9.9)
CANNABINOID SCREEN URINE: ABNORMAL
CHLORIDE BLD-SCNC: 100 MEQ/L (ref 95–107)
CO2: 27 MEQ/L (ref 20–31)
COCAINE METABOLITE SCREEN URINE: POSITIVE
CREAT SERPL-MCNC: 1.05 MG/DL (ref 0.7–1.2)
EOSINOPHILS ABSOLUTE: 0 K/UL (ref 0–0.7)
EOSINOPHILS RELATIVE PERCENT: 0.2 %
GFR AFRICAN AMERICAN: >60
GFR NON-AFRICAN AMERICAN: >60
GLOBULIN: 2.8 G/DL (ref 2.3–3.5)
GLUCOSE BLD-MCNC: 111 MG/DL (ref 70–99)
HCT VFR BLD CALC: 49.4 % (ref 42–52)
HEMOGLOBIN: 17.1 G/DL (ref 14–18)
LACTIC ACID: 1 MMOL/L (ref 0.5–2.2)
LYMPHOCYTES ABSOLUTE: 0.6 K/UL (ref 1–4.8)
LYMPHOCYTES RELATIVE PERCENT: 6.2 %
Lab: ABNORMAL
MCH RBC QN AUTO: 32.8 PG (ref 27–31.3)
MCHC RBC AUTO-ENTMCNC: 34.6 % (ref 33–37)
MCV RBC AUTO: 94.7 FL (ref 80–100)
METHADONE SCREEN, URINE: ABNORMAL
METHAMPHETAMINE, URINE: ABNORMAL
MONOCYTES ABSOLUTE: 1.2 K/UL (ref 0.2–0.8)
MONOCYTES RELATIVE PERCENT: 12.2 %
NEUTROPHILS ABSOLUTE: 7.8 K/UL (ref 1.4–6.5)
NEUTROPHILS RELATIVE PERCENT: 80.5 %
OPIATE SCREEN URINE: ABNORMAL
PDW BLD-RTO: 13.7 % (ref 11.5–14.5)
PHENCYCLIDINE SCREEN URINE: ABNORMAL
PLATELET # BLD: 121 K/UL (ref 130–400)
PLATELET SLIDE REVIEW: ABNORMAL
POTASSIUM SERPL-SCNC: 3.8 MEQ/L (ref 3.4–4.9)
PROPOXYPHENE SCREEN, URINE: ABNORMAL
RBC # BLD: 5.22 M/UL (ref 4.7–6.1)
SEDIMENTATION RATE, ERYTHROCYTE: 20 MM (ref 0–10)
SLIDE REVIEW: ABNORMAL
SODIUM BLD-SCNC: 135 MEQ/L (ref 135–144)
TOTAL PROTEIN: 6.9 G/DL (ref 6.3–8)
TRICYCLIC, URINE: ABNORMAL
UR OXYCODONE RAPID SCREEN: ABNORMAL
WBC # BLD: 9.6 K/UL (ref 4.8–10.8)

## 2020-08-23 PROCEDURE — 87186 SC STD MICRODIL/AGAR DIL: CPT

## 2020-08-23 PROCEDURE — 85025 COMPLETE CBC W/AUTO DIFF WBC: CPT

## 2020-08-23 PROCEDURE — 87040 BLOOD CULTURE FOR BACTERIA: CPT

## 2020-08-23 PROCEDURE — 80053 COMPREHEN METABOLIC PANEL: CPT

## 2020-08-23 PROCEDURE — 96366 THER/PROPH/DIAG IV INF ADDON: CPT

## 2020-08-23 PROCEDURE — 36415 COLL VENOUS BLD VENIPUNCTURE: CPT

## 2020-08-23 PROCEDURE — 6360000002 HC RX W HCPCS: Performed by: PHYSICIAN ASSISTANT

## 2020-08-23 PROCEDURE — 87149 DNA/RNA DIRECT PROBE: CPT

## 2020-08-23 PROCEDURE — 86140 C-REACTIVE PROTEIN: CPT

## 2020-08-23 PROCEDURE — 87077 CULTURE AEROBIC IDENTIFY: CPT

## 2020-08-23 PROCEDURE — 99284 EMERGENCY DEPT VISIT MOD MDM: CPT

## 2020-08-23 PROCEDURE — 83605 ASSAY OF LACTIC ACID: CPT

## 2020-08-23 PROCEDURE — 2580000003 HC RX 258: Performed by: PHYSICIAN ASSISTANT

## 2020-08-23 PROCEDURE — 96365 THER/PROPH/DIAG IV INF INIT: CPT

## 2020-08-23 PROCEDURE — 80306 DRUG TEST PRSMV INSTRMNT: CPT

## 2020-08-23 PROCEDURE — 85652 RBC SED RATE AUTOMATED: CPT

## 2020-08-23 RX ORDER — 0.9 % SODIUM CHLORIDE 0.9 %
1000 INTRAVENOUS SOLUTION INTRAVENOUS ONCE
Status: COMPLETED | OUTPATIENT
Start: 2020-08-23 | End: 2020-08-23

## 2020-08-23 RX ADMIN — VANCOMYCIN HYDROCHLORIDE 1250 MG: 5 INJECTION, POWDER, LYOPHILIZED, FOR SOLUTION INTRAVENOUS at 21:10

## 2020-08-23 RX ADMIN — SODIUM CHLORIDE 1000 ML: 9 INJECTION, SOLUTION INTRAVENOUS at 20:33

## 2020-08-23 ASSESSMENT — ENCOUNTER SYMPTOMS
EYE PAIN: 0
ALLERGIC/IMMUNOLOGIC NEGATIVE: 1
ABDOMINAL PAIN: 0
APNEA: 0
COLOR CHANGE: 0
TROUBLE SWALLOWING: 0
SHORTNESS OF BREATH: 0

## 2020-08-23 NOTE — ED TRIAGE NOTES
Pt to ER with c/o lightheadedness and chills x 3 days, pt states he finally broke his fever last night, pt a&ox4, resp even and unlabored, pt has PICC line that he gets ATB infusions through for infection in finger

## 2020-08-23 NOTE — ED PROVIDER NOTES
[facility] [facility] ED  eMERGENCYdEPARTMENT eNCOUnter      Pt Name: Karan Zelaya  MRN: [id number]  [place] 1975  Date of evaluation: 8/23/2020  Provider:Mark Carmona PA-C    CHIEF COMPLAINT       Chief Complaint   Patient presents with    Chills    Dizziness         HISTORY OF PRESENT ILLNESS  (Location/Symptom, Timing/Onset, Context/Setting, Quality, Duration, Modifying Factors, Severity.)   Karan Zelaya is a 53 y.o. male who presents to the emergency department with complaints of subjective fever and chills, fatigue, generalized weakness and malaise. Patient states that these been ongoing for the past 3 to 4 days. Patient has a known infection to his left finger with tenosynovitis. Patient is set to have infusions at the outpatient infusion center twice a day, however patient states that his transportation is difficult and so he has not gone since Friday morning to have his infusions, missing both infusions yesterday and today. Patient states that ever since he stopped going he was having these symptoms. Patient is asking to be admitted to the hospital to have his infusions done, as he has 7 days left of infusions    HPI    Nursing Notes were reviewed and I agree. REVIEW OF SYSTEMS    (2-9 systems for level 4, 10 or more for level 5)     Review of Systems   Constitutional: Positive for activity change, appetite change, chills, diaphoresis, fatigue and fever. HENT: Negative for hearing loss and trouble swallowing. Eyes: Negative for pain. Respiratory: Negative for apnea and shortness of breath. Cardiovascular: Negative for chest pain. Gastrointestinal: Negative for abdominal pain. Endocrine: Negative. Genitourinary: Negative for hematuria. Musculoskeletal: Positive for joint swelling. Negative for neck pain and neck stiffness. Skin: Negative for color change. Allergic/Immunologic: Negative. Neurological: Positive for dizziness. Negative for numbness. sexual activity: None   Other Topics Concern    None   Social History Narrative    None       SCREENINGS           PHYSICAL EXAM    (up to 7 forlevel 4, 8 or more for level 5)     ED Triage Vitals [08/23/20 1921]   BP Temp Temp Source Pulse Resp SpO2 Height Weight   114/79 97.1 °F (36.2 °C) Temporal 100 19 96 % 5' 9\" (1.753 m) 180 lb (81.6 kg)       Physical Exam  Vitals signs and nursing note reviewed. Constitutional:       General: He is not in acute distress. Appearance: He is well-developed. He is not diaphoretic. HENT:      Head: Normocephalic and atraumatic. Mouth/Throat:      Pharynx: No oropharyngeal exudate. Eyes:      General: No scleral icterus. Conjunctiva/sclera: Conjunctivae normal.      Pupils: Pupils are equal, round, and reactive to light. Neck:      Musculoskeletal: Normal range of motion and neck supple. Trachea: No tracheal deviation. Cardiovascular:      Rate and Rhythm: Normal rate. Heart sounds: Normal heart sounds. Pulmonary:      Effort: Pulmonary effort is normal. No respiratory distress. Breath sounds: Normal breath sounds. Abdominal:      General: Bowel sounds are normal. There is no distension. Palpations: Abdomen is soft. Musculoskeletal: Normal range of motion. Skin:     General: Skin is warm and dry. Findings: No erythema or rash. Neurological:      Mental Status: He is alert and oriented to person, place, and time. Cranial Nerves: No cranial nerve deficit. Motor: No abnormal muscle tone. Psychiatric:         Behavior: Behavior normal.         Thought Content:  Thought content normal.         Judgment: Judgment normal.           DIAGNOSTIC RESULTS     RADIOLOGY:   Non-plain film images such as CT, Ultrasound and MRI are read by the radiologist. Plain radiographic images are visualized and preliminarilyinterpreted by Qiana Dominguez PA-C with the below findings:      Interpretation per the Radiologist below, if available at the time of this note:    No orders to display       LABS:  Labs Reviewed   COMPREHENSIVE METABOLIC PANEL - Abnormal; Notable for the following components:       Result Value    Anion Gap 8 (*)     Glucose 111 (*)     Total Bilirubin 1.4 (*)     All other components within normal limits   CBC WITH AUTO DIFFERENTIAL - Abnormal; Notable for the following components:    MCH 32.8 (*)     Platelets 965 (*)     Neutrophils Absolute 7.8 (*)     Lymphocytes Absolute 0.6 (*)     Monocytes Absolute 1.2 (*)     All other components within normal limits   SEDIMENTATION RATE - Abnormal; Notable for the following components:    Sed Rate 20 (*)     All other components within normal limits   C-REACTIVE PROTEIN - Abnormal; Notable for the following components:    .0 (*)     All other components within normal limits   URINE DRUG SCREEN, COMPREHENSIVE - Abnormal; Notable for the following components:    Cocaine Metabolite Screen, Urine POSITIVE (*)     All other components within normal limits   CULTURE, BLOOD 1   CULTURE, BLOOD 2   LACTIC ACID, PLASMA       All other labs were within normal range or not returnedas of this dictation. EMERGENCYDEPARTMENT COURSE and DIFFERENTIAL DIAGNOSIS/MDM:   Vitals:    Vitals:    08/23/20 1921 08/23/20 2115 08/23/20 2130   BP: 114/79 94/83 (!) 144/87   Pulse: 100     Resp: 19     Temp: 97.1 °F (36.2 °C)  97.9 °F (36.6 °C)   TempSrc: Temporal  Oral   SpO2: 96%     Weight: 180 lb (81.6 kg)     Height: 5' 9\" (1.753 m)         REASSESSMENT      Patient presented emergency department with complaints of subjective fever and chills as well as lightheadedness after missing multiple doses of vancomycin at the infusion center due to not having transportation to go. Patient's laboratory testing is unremarkable. Wound itself looks well with very minimal erythema and I do not see any worsening swelling or drainage.   Patient received IV dose of vancomycin while in the emergency department. He was provided with the phone numbers for his insurance provider right so he can get rides to his infusions. I stressed the importance of actually going to get the infusions done. MDM    PROCEDURES:    Procedures      FINAL IMPRESSION      1. Dizziness    2. Cocaine abuse [facility])          DISPOSITION/PLAN   DISPOSITION Discharge - Pending Orders Complete 08/23/2020 10:15:35 PM      PATIENT REFERRED TO:  No follow-up provider specified.     DISCHARGE MEDICATIONS:  New Prescriptions    No medications on file       (Please note that portions of this note were completed with a voice recognition program.  Efforts were made to edit the dictations but occasionally words are mis-transcribed.)    JEFFREY Quintero PA-C  08/23/20 7529

## 2020-08-24 ENCOUNTER — TELEPHONE (OUTPATIENT)
Dept: INFECTIOUS DISEASES | Age: 45
End: 2020-08-24

## 2020-08-24 ENCOUNTER — HOSPITAL ENCOUNTER (OUTPATIENT)
Dept: INFUSION THERAPY | Age: 45
Setting detail: INFUSION SERIES
Discharge: HOME | DRG: 314 | End: 2020-08-24
Payer: COMMERCIAL

## 2020-08-24 ENCOUNTER — HOSPITAL ENCOUNTER (INPATIENT)
Age: 45
LOS: 2 days | Discharge: HOME | DRG: 314 | End: 2020-08-26
Attending: EMERGENCY MEDICINE | Admitting: INTERNAL MEDICINE
Payer: COMMERCIAL

## 2020-08-24 ENCOUNTER — APPOINTMENT (OUTPATIENT)
Dept: GENERAL RADIOLOGY | Age: 45
DRG: 314 | End: 2020-08-24
Payer: COMMERCIAL

## 2020-08-24 ENCOUNTER — APPOINTMENT (OUTPATIENT)
Dept: INFUSION THERAPY | Age: 45
DRG: 314 | End: 2020-08-24
Payer: COMMERCIAL

## 2020-08-24 VITALS
OXYGEN SATURATION: 99 % | SYSTOLIC BLOOD PRESSURE: 142 MMHG | HEART RATE: 135 BPM | RESPIRATION RATE: 20 BRPM | DIASTOLIC BLOOD PRESSURE: 78 MMHG | TEMPERATURE: 98.9 F

## 2020-08-24 DIAGNOSIS — M65.9 TENOSYNOVITIS OF FINGER: Primary | ICD-10-CM

## 2020-08-24 PROBLEM — B96.20 BACTEREMIA DUE TO ESCHERICHIA COLI: Status: ACTIVE | Noted: 2020-08-24

## 2020-08-24 PROBLEM — R78.81 BACTEREMIA DUE TO ESCHERICHIA COLI: Status: ACTIVE | Noted: 2020-08-24

## 2020-08-24 LAB
ALBUMIN SERPL-MCNC: 4.3 G/DL (ref 3.5–4.6)
ALP BLD-CCNC: 78 U/L (ref 35–104)
ALT SERPL-CCNC: 26 U/L (ref 0–41)
ANION GAP SERPL CALCULATED.3IONS-SCNC: 19 MEQ/L (ref 9–15)
AST SERPL-CCNC: 24 U/L (ref 0–40)
BACTERIA: NEGATIVE /HPF
BILIRUB SERPL-MCNC: 1.4 MG/DL (ref 0.2–0.7)
BILIRUBIN URINE: NEGATIVE
BLOOD, URINE: ABNORMAL
BUN BLDV-MCNC: 9 MG/DL (ref 6–20)
CALCIUM SERPL-MCNC: 9.1 MG/DL (ref 8.5–9.9)
CHLORIDE BLD-SCNC: 97 MEQ/L (ref 95–107)
CLARITY: ABNORMAL
CO2: 23 MEQ/L (ref 20–31)
COLOR: ABNORMAL
CREAT SERPL-MCNC: 1.01 MG/DL (ref 0.7–1.2)
EKG ATRIAL RATE: 124 BPM
EKG P AXIS: 41 DEGREES
EKG P-R INTERVAL: 144 MS
EKG Q-T INTERVAL: 294 MS
EKG QRS DURATION: 72 MS
EKG QTC CALCULATION (BAZETT): 422 MS
EKG R AXIS: 61 DEGREES
EKG T AXIS: 67 DEGREES
EKG VENTRICULAR RATE: 124 BPM
EPITHELIAL CELLS, UA: ABNORMAL /HPF (ref 0–5)
GFR AFRICAN AMERICAN: >60
GFR NON-AFRICAN AMERICAN: >60
GLOBULIN: 3.5 G/DL (ref 2.3–3.5)
GLUCOSE BLD-MCNC: 93 MG/DL (ref 70–99)
GLUCOSE URINE: NEGATIVE MG/DL
HCT VFR BLD CALC: 49 % (ref 42–52)
HEMOGLOBIN: 16.6 G/DL (ref 14–18)
HYALINE CASTS: ABNORMAL /LPF (ref 0–5)
KETONES, URINE: NEGATIVE MG/DL
LACTIC ACID: 0.8 MMOL/L (ref 0.5–2.2)
LEUKOCYTE ESTERASE, URINE: ABNORMAL
MCH RBC QN AUTO: 32.6 PG (ref 27–31.3)
MCHC RBC AUTO-ENTMCNC: 33.9 % (ref 33–37)
MCV RBC AUTO: 96.2 FL (ref 80–100)
NITRITE, URINE: NEGATIVE
PDW BLD-RTO: 13.7 % (ref 11.5–14.5)
PH UA: 5 (ref 5–9)
PLATELET # BLD: 121 K/UL (ref 130–400)
POTASSIUM SERPL-SCNC: 4.1 MEQ/L (ref 3.4–4.9)
PROTEIN UA: 100 MG/DL
RBC # BLD: 5.09 M/UL (ref 4.7–6.1)
RBC UA: ABNORMAL /HPF (ref 0–5)
SODIUM BLD-SCNC: 139 MEQ/L (ref 135–144)
SPECIFIC GRAVITY UA: 1.03 (ref 1–1.03)
TOTAL PROTEIN: 7.8 G/DL (ref 6.3–8)
UROBILINOGEN, URINE: 1 E.U./DL
WBC # BLD: 3.5 K/UL (ref 4.8–10.8)
WBC UA: ABNORMAL /HPF (ref 0–5)

## 2020-08-24 PROCEDURE — 2580000003 HC RX 258: Performed by: INTERNAL MEDICINE

## 2020-08-24 PROCEDURE — 99254 IP/OBS CNSLTJ NEW/EST MOD 60: CPT | Performed by: INTERNAL MEDICINE

## 2020-08-24 PROCEDURE — 96374 THER/PROPH/DIAG INJ IV PUSH: CPT

## 2020-08-24 PROCEDURE — 96375 TX/PRO/DX INJ NEW DRUG ADDON: CPT

## 2020-08-24 PROCEDURE — 93010 ELECTROCARDIOGRAM REPORT: CPT | Performed by: INTERNAL MEDICINE

## 2020-08-24 PROCEDURE — 99285 EMERGENCY DEPT VISIT HI MDM: CPT

## 2020-08-24 PROCEDURE — 87077 CULTURE AEROBIC IDENTIFY: CPT

## 2020-08-24 PROCEDURE — 96365 THER/PROPH/DIAG IV INF INIT: CPT

## 2020-08-24 PROCEDURE — 93005 ELECTROCARDIOGRAM TRACING: CPT | Performed by: EMERGENCY MEDICINE

## 2020-08-24 PROCEDURE — 83605 ASSAY OF LACTIC ACID: CPT

## 2020-08-24 PROCEDURE — 87040 BLOOD CULTURE FOR BACTERIA: CPT

## 2020-08-24 PROCEDURE — 36415 COLL VENOUS BLD VENIPUNCTURE: CPT

## 2020-08-24 PROCEDURE — 81001 URINALYSIS AUTO W/SCOPE: CPT

## 2020-08-24 PROCEDURE — 6360000002 HC RX W HCPCS: Performed by: INTERNAL MEDICINE

## 2020-08-24 PROCEDURE — 6370000000 HC RX 637 (ALT 250 FOR IP): Performed by: EMERGENCY MEDICINE

## 2020-08-24 PROCEDURE — 1210000000 HC MED SURG R&B

## 2020-08-24 PROCEDURE — 71045 X-RAY EXAM CHEST 1 VIEW: CPT

## 2020-08-24 PROCEDURE — 80053 COMPREHEN METABOLIC PANEL: CPT

## 2020-08-24 PROCEDURE — 6370000000 HC RX 637 (ALT 250 FOR IP): Performed by: INTERNAL MEDICINE

## 2020-08-24 PROCEDURE — 6360000002 HC RX W HCPCS: Performed by: EMERGENCY MEDICINE

## 2020-08-24 PROCEDURE — 2580000003 HC RX 258: Performed by: EMERGENCY MEDICINE

## 2020-08-24 PROCEDURE — 85027 COMPLETE CBC AUTOMATED: CPT

## 2020-08-24 RX ORDER — DIPHENHYDRAMINE HYDROCHLORIDE 50 MG/ML
50 INJECTION INTRAMUSCULAR; INTRAVENOUS ONCE
Status: COMPLETED | OUTPATIENT
Start: 2020-08-24 | End: 2020-08-24

## 2020-08-24 RX ORDER — ACETAMINOPHEN 325 MG/1
650 TABLET ORAL EVERY 6 HOURS PRN
Status: DISCONTINUED | OUTPATIENT
Start: 2020-08-24 | End: 2020-08-26 | Stop reason: HOSPADM

## 2020-08-24 RX ORDER — 0.9 % SODIUM CHLORIDE 0.9 %
1000 INTRAVENOUS SOLUTION INTRAVENOUS ONCE
Status: COMPLETED | OUTPATIENT
Start: 2020-08-24 | End: 2020-08-24

## 2020-08-24 RX ORDER — ONDANSETRON 2 MG/ML
4 INJECTION INTRAMUSCULAR; INTRAVENOUS ONCE
Status: COMPLETED | OUTPATIENT
Start: 2020-08-24 | End: 2020-08-24

## 2020-08-24 RX ORDER — PANTOPRAZOLE SODIUM 40 MG/1
40 TABLET, DELAYED RELEASE ORAL
Status: DISCONTINUED | OUTPATIENT
Start: 2020-08-25 | End: 2020-08-26 | Stop reason: HOSPADM

## 2020-08-24 RX ORDER — SODIUM CHLORIDE 9 MG/ML
INJECTION, SOLUTION INTRAVENOUS CONTINUOUS
Status: DISCONTINUED | OUTPATIENT
Start: 2020-08-24 | End: 2020-08-26 | Stop reason: HOSPADM

## 2020-08-24 RX ORDER — SODIUM CHLORIDE 0.9 % (FLUSH) 0.9 %
10 SYRINGE (ML) INJECTION EVERY 12 HOURS SCHEDULED
Status: DISCONTINUED | OUTPATIENT
Start: 2020-08-24 | End: 2020-08-26 | Stop reason: HOSPADM

## 2020-08-24 RX ORDER — SODIUM CHLORIDE 0.9 % (FLUSH) 0.9 %
10 SYRINGE (ML) INJECTION PRN
Status: DISCONTINUED | OUTPATIENT
Start: 2020-08-24 | End: 2020-08-26 | Stop reason: HOSPADM

## 2020-08-24 RX ORDER — ACETAMINOPHEN 650 MG/1
650 SUPPOSITORY RECTAL EVERY 6 HOURS PRN
Status: DISCONTINUED | OUTPATIENT
Start: 2020-08-24 | End: 2020-08-26 | Stop reason: HOSPADM

## 2020-08-24 RX ORDER — DIPHENHYDRAMINE HYDROCHLORIDE 50 MG/ML
25 INJECTION INTRAMUSCULAR; INTRAVENOUS EVERY 6 HOURS PRN
Status: DISCONTINUED | OUTPATIENT
Start: 2020-08-24 | End: 2020-08-26 | Stop reason: HOSPADM

## 2020-08-24 RX ORDER — ACETAMINOPHEN 500 MG
1000 TABLET ORAL ONCE
Status: COMPLETED | OUTPATIENT
Start: 2020-08-24 | End: 2020-08-24

## 2020-08-24 RX ADMIN — VANCOMYCIN HYDROCHLORIDE 1250 MG: 5 INJECTION, POWDER, LYOPHILIZED, FOR SOLUTION INTRAVENOUS at 22:11

## 2020-08-24 RX ADMIN — SODIUM CHLORIDE 1000 ML: 9 INJECTION, SOLUTION INTRAVENOUS at 10:39

## 2020-08-24 RX ADMIN — ACETAMINOPHEN 1000 MG: 500 TABLET ORAL at 10:43

## 2020-08-24 RX ADMIN — ACETAMINOPHEN 650 MG: 325 TABLET, FILM COATED ORAL at 22:19

## 2020-08-24 RX ADMIN — ONDANSETRON 4 MG: 2 INJECTION INTRAMUSCULAR; INTRAVENOUS at 10:40

## 2020-08-24 RX ADMIN — VANCOMYCIN HYDROCHLORIDE 1250 MG: 5 INJECTION, POWDER, LYOPHILIZED, FOR SOLUTION INTRAVENOUS at 09:11

## 2020-08-24 RX ADMIN — PIPERACILLIN AND TAZOBACTAM 3.38 G: 3; .375 INJECTION, POWDER, LYOPHILIZED, FOR SOLUTION INTRAVENOUS at 23:50

## 2020-08-24 RX ADMIN — DIPHENHYDRAMINE HYDROCHLORIDE 25 MG: 50 INJECTION, SOLUTION INTRAMUSCULAR; INTRAVENOUS at 16:53

## 2020-08-24 RX ADMIN — SODIUM CHLORIDE: 9 INJECTION, SOLUTION INTRAVENOUS at 13:56

## 2020-08-24 RX ADMIN — SODIUM CHLORIDE 1000 ML: 9 INJECTION, SOLUTION INTRAVENOUS at 10:45

## 2020-08-24 RX ADMIN — PIPERACILLIN AND TAZOBACTAM 3.38 G: 3; .375 INJECTION, POWDER, LYOPHILIZED, FOR SOLUTION INTRAVENOUS at 13:57

## 2020-08-24 RX ADMIN — DIPHENHYDRAMINE HYDROCHLORIDE 50 MG: 50 INJECTION, SOLUTION INTRAMUSCULAR; INTRAVENOUS at 13:02

## 2020-08-24 RX ADMIN — DIPHENHYDRAMINE HYDROCHLORIDE 25 MG: 50 INJECTION, SOLUTION INTRAMUSCULAR; INTRAVENOUS at 22:19

## 2020-08-24 ASSESSMENT — ENCOUNTER SYMPTOMS
NAUSEA: 1
DIARRHEA: 0
ABDOMINAL PAIN: 0
EYES NEGATIVE: 1
SHORTNESS OF BREATH: 1
FACIAL SWELLING: 0
RHINORRHEA: 0
EYE DISCHARGE: 0
ABDOMINAL DISTENTION: 0
COUGH: 0
VOMITING: 0
CONSTIPATION: 0
WHEEZING: 0
COUGH: 1
CHOKING: 0
CHEST TIGHTNESS: 0
COLOR CHANGE: 0

## 2020-08-24 ASSESSMENT — PAIN SCALES - GENERAL
PAINLEVEL_OUTOF10: 0
PAINLEVEL_OUTOF10: 0
PAINLEVEL_OUTOF10: 2

## 2020-08-24 NOTE — ED NOTES
Pt requesting to be medicated with allergy pill at this time for sneezing/congestion. Dr. Chanel Dys advised.      Judy Fothergill, RN  08/24/20 7238

## 2020-08-24 NOTE — ED NOTES
Pt reports that he has felt chills that started last night pt states that his last infusion  Was on Friday pt has visible healing wound to left finger no drainage noted      Kory Kaplan, LAURA  08/23/20 2100

## 2020-08-24 NOTE — ED TRIAGE NOTES
Pt presents to ED from infusion center with c/o allergic reaction versus sepsis. Pt states he received approximately 1/3 of his vanco infusion prior to feeling extremely chilled and SOB. Per pt, he was seen in this ED yesterday for similar c/o's and states that he received vanco in ED and it intensified chills and SOB. Upon assessment, pt is A/Ox4, skin p/w/d, resp even and unlabored, msp's intact. Pt denies cp, diarrhea; however admits to fever, n/v.   Pt experienced multiple episodes of emesis on arrival to ED from infusion center. Pt denies tightness in his throat, no rash/hives noted.

## 2020-08-24 NOTE — H&P
Department of Internal Medicine  General Internal Medicine  Attending History and Physical      CHIEF COMPLAINT:  Vancomycin reaction    Reason for Admission:  E Coli bacteremia, sepsis    History Obtained From:  patient    HISTORY OF PRESENT ILLNESS:      The patient is a 53 y.o. male with significant past medical history of tenosynovitis who was receiving outpatient vancomycin infusions presented to the ED after having what he described as a severe reaction to his vancomycin infusion earlier in the day. Pt states that he had missed a couple infusions and did not feel well during his infusion yesterday so he came to the ED and workup at that time was negative and he was sent home, but today states that immediately after infusion began, he broke out in chills, was sweating and sob so he came back to the ED where he was tachycardic, tachypneic and febrile. Chart review showed that the cultures drawn from his first ED visit was growing E Coli. Pt admitted for sepsis 2/2 E Coli bacteremia. Past Medical History:        Diagnosis Date    Cancer [facility])     history of testicular      Past Surgical History:        Procedure Laterality Date    BACK SURGERY      HAND SURGERY Left 8/4/2020    WASHOUT I&D LEFT RING FINGER ROOM performed by Santiago Nam MD at [address] [place] [address]           Medications Prior to Admission:    No medications prior to admission. Allergies:  Patient has no known allergies. Social History:   Current smoker, no etoh, no drugs    Family History:   History reviewed. No pertinent family history. REVIEW OF SYSTEMS:  12 point ROS was negative unless otherwise noted in the HPI   PHYSICAL EXAM:    Vitals:  BP (!) 86/53   Pulse 72   Temp 97.9 °F (36.6 °C) (Oral)   Resp 18   Ht 5' 9\" (1.753 m)   Wt 180 lb (81.6 kg)   SpO2 98%   BMI 26.58 kg/m²     Constitutional: Awake and alert in no acute distress.  Lying in bed comfortably  Head: Normocephalic, atraumatic  Eyes: EOMI, PERRLA  ENT: moist mucous membranes  Neck: neck supple, trachea midline  Lungs: Good inspiratory effort, CTABL, no wheeze, no rhonchi, no rales  Heart: RRR, normal S1 and S2, no murmurs  GI: Soft, non-distended, non tender, no guarding, no rebound, +BS  MSK: Full ROM bilaterally, 5/5 strength bilaterally, no edema noted, PICC in place in R arm  Skin: warm, dry  Psych: appropriate affect       DATA:  CBC:   Lab Results   Component Value Date    WBC 3.5 08/24/2020    RBC 5.09 08/24/2020    HGB 16.6 08/24/2020    HCT 49.0 08/24/2020    MCV 96.2 08/24/2020    MCH 32.6 08/24/2020    MCHC 33.9 08/24/2020    RDW 13.7 08/24/2020     08/24/2020     CMP:    Lab Results   Component Value Date     08/24/2020    K 4.1 08/24/2020    CL 97 08/24/2020    CO2 23 08/24/2020    BUN 9 08/24/2020    CREATININE 1.01 08/24/2020    GFRAA >60.0 08/24/2020    LABGLOM >60.0 08/24/2020    GLUCOSE 93 08/24/2020    PROT 7.8 08/24/2020    LABALBU 4.3 08/24/2020    CALCIUM 9.1 08/24/2020    BILITOT 1.4 08/24/2020    ALKPHOS 78 08/24/2020    AST 24 08/24/2020    ALT 26 08/24/2020     ASSESSMENT AND PLAN:      # Sepsis 2/2 E coli bacteremia  - was receiving vanco for tenosynovitis  - likely 2/2 PICC line infection  - ID consulted - continue vanco/zosyn and remove PICC  - follow cultures- new cultures drawn in ED today    DVT: Lovenox    Disposition: Pt to have PICC removed. Will likely need negative blood cultures before placement of new line. ID consulted. Anticipated length of stay greater than 48 hours.         Brock Green DO  Internal Medicine

## 2020-08-24 NOTE — FLOWSHEET NOTE
Patient called with call light, noted severe tremors, patient states he can't get warm. States this is what brought him into ER last night. Infusion more than half complete. Infusion stopped. Vitals obtained with pulse ox. Temp 98.9 oral, heart rate 125-130's, BP  142/78, resp rate 20, pulse ox 99% on room air. Report called to ER and  patient transported to ER immediatly for evaluation. Dr Pritesh Heredia office updated, spoke with Basilio.

## 2020-08-24 NOTE — TELEPHONE ENCOUNTER
Received call from Sujatha Young ( Out/PT Infusion) States patient did not show up over the weekend except last night he went to the ER. States patient went to Out Patient ,while they infuse the Abx, States patient Dizzy and chills then send him to the ER again. .  I will update .

## 2020-08-24 NOTE — ED NOTES
Call placed to pt friend Phoebe for a ride      Laishaneptali [facility]  08/23/20 [id number]

## 2020-08-24 NOTE — ED NOTES
Pt verbalizes understanding of dc needs and importance of flow up for IV medications   Pt given caresource ID number and telephone number to call for transportation issues      Nilam Nevarez RN  08/23/20 [id number]

## 2020-08-24 NOTE — ED PROVIDER NOTES
[address] [facility] ED  eMERGENCY dEPARTMENT eNCOUnter      Pt Name: Tamela Baugh  MRN: [id number]  [place] 1975  Date of evaluation: 8/24/2020  Provider: Jovanna Rivera, [address] [place] [address]       Chief Complaint   Patient presents with    Allergic Reaction     pt was at infusion center receiving vanco and he developed chills and sob         HISTORY OF PRESENT ILLNESS   (Location/Symptom, Timing/Onset,Context/Setting, Quality, Duration, Modifying Factors, Severity)  Note limiting factors. Tamela Baugh is a 53 y.o. male who presents to the emergency department with a chief complaint of \"allergic reaction\". Patient has been receiving Vanco infusions for tenosynovitis however he has missed multiple doses in the recent past due to transportation issues. He had been feeling fevers and chills and weakness and sick the last 3 days prior to presenting to the ER yesterday. He received a dose yesterday and then was able to make it to the transfusion center today where detention through the infusion he began with shaking chills and fever again. He feels nauseated, headache, and terrible all over. He denies any history of IVDA. He denies any pain or redness at his PICC line site. HPI    NursingNotes were reviewed. REVIEW OF SYSTEMS    (2-9 systems for level 4, 10 or more for level 5)     Review of Systems   Constitutional: Positive for activity change, appetite change, chills, fatigue and fever. HENT: Negative for congestion, facial swelling and rhinorrhea. Eyes: Negative for discharge. Respiratory: Positive for cough and shortness of breath. Cardiovascular: Negative for chest pain and leg swelling. Gastrointestinal: Positive for nausea. Negative for abdominal pain and vomiting. Endocrine: Negative for cold intolerance. Genitourinary: Negative for difficulty urinating. Musculoskeletal: Negative for arthralgias and myalgias. Skin: Negative for color change. Allergic/Immunologic: Negative for environmental allergies. Neurological: Positive for dizziness and weakness. Negative for light-headedness. Hematological: Negative for adenopathy. Psychiatric/Behavioral: Negative for behavioral problems. Except as noted above the remainder of the review of systems was reviewed and negative. PAST MEDICAL HISTORY     Past Medical History:   Diagnosis Date    Cancer [facility])     history of testicular          SURGICALHISTORY       Past Surgical History:   Procedure Laterality Date    BACK SURGERY      HAND SURGERY Left 8/4/2020    WASHOUT I&D LEFT RING FINGER ROOM performed by Dmitry Wong MD at [address]       Previous Medications    No medications on file       ALLERGIES     Vancomycin    FAMILY HISTORY     History reviewed. No pertinent family history. SOCIAL HISTORY       Social History     Socioeconomic History    Marital status:      Spouse name: None    Number of children: None    Years of education: None    Highest education level: None   Occupational History    None   Social Needs    Financial resource strain: None    Food insecurity     Worry: None     Inability: None    Transportation needs     Medical: None     Non-medical: None   Tobacco Use    Smoking status: Current Every Day Smoker     Packs/day: 1.00     Years: 20.00     Pack years: 20.00    Smokeless tobacco: Never Used   Substance and Sexual Activity    Alcohol use:  Yes    Drug use: No    Sexual activity: Yes   Lifestyle    Physical activity     Days per week: None     Minutes per session: None    Stress: None   Relationships    Social connections     Talks on phone: None     Gets together: None     Attends Confucianism service: None     Active member of club or organization: None     Attends meetings of clubs or organizations: None     Relationship status: None    Intimate partner violence     Fear of current or ex partner: None     Emotionally abused: None     Physically abused: None     Forced sexual activity: None   Other Topics Concern    None   Social History Narrative    None       SCREENINGS    Randy Coma Scale  Eye Opening: Spontaneous  Best Verbal Response: Oriented  Best Motor Response: Obeys commands  Randy Coma Scale Score: 15 @FLOW([id number])@      PHYSICAL EXAM    (up to 7 for level 4, 8 or more for level 5)     ED Triage Vitals [08/24/20 1020]   BP Temp Temp Source Pulse Resp SpO2 Height Weight   (!) 177/97 100 °F (37.8 °C) Temporal 155 (!) 36 100 % 5' 9\" (1.753 m) 180 lb (81.6 kg)       Physical Exam  Constitutional:       General: He is in acute distress. Appearance: He is well-developed. He is ill-appearing, toxic-appearing and diaphoretic. HENT:      Head: Normocephalic and atraumatic. Right Ear: Tympanic membrane normal.      Left Ear: Tympanic membrane normal.      Nose: No congestion. Eyes:      Conjunctiva/sclera: Conjunctivae normal.      Pupils: Pupils are equal, round, and reactive to light. Neck:      Musculoskeletal: Normal range of motion and neck supple. Cardiovascular:      Rate and Rhythm: Tachycardia present. Heart sounds: No murmur. Pulmonary:      Effort: Pulmonary effort is normal.      Breath sounds: No wheezing or rales. Abdominal:      General: Bowel sounds are normal.      Palpations: Abdomen is soft. Tenderness: There is no abdominal tenderness. Musculoskeletal: Normal range of motion. Right lower leg: No edema. Left lower leg: No edema. Comments: PICC line noted without signs of infection right arm   Lymphadenopathy:      Cervical: No cervical adenopathy. Skin:     General: Skin is warm. Neurological:      Mental Status: He is alert and oriented to person, place, and time. Motor: Weakness present. Deep Tendon Reflexes: Reflexes are normal and symmetric.    Psychiatric:         Mood and Affect: Mood normal.         DIAGNOSTIC RESULTS     EKG: All EKG's are interpreted by the Emergency Department Physician who either signs or Co-signsthis chart in the absence of a cardiologist.    Sinus tachycardia at 124 bpm with no acute ST segment elevation or T wave inversion    RADIOLOGY:   Non-plain filmimages such as CT, Ultrasound and MRI are read by the radiologist. Plain radiographic images are visualized and preliminarily interpreted by the emergency physician with the below findings:        Interpretation per the Radiologist below, if available at the time ofthis note:    XR CHEST PORTABLE   Final Result   No acute cardiopulmonary process seen. ED BEDSIDE ULTRASOUND:   Performed by ED Physician - none    LABS:  Labs Reviewed   CBC - Abnormal; Notable for the following components:       Result Value    WBC 3.5 (*)     MCH 32.6 (*)     Platelets 537 (*)     All other components within normal limits   COMPREHENSIVE METABOLIC PANEL - Abnormal; Notable for the following components:    Anion Gap 19 (*)     Total Bilirubin 1.4 (*)     All other components within normal limits   URINALYSIS - Abnormal; Notable for the following components:    Color, UA DARK YELLOW (*)     Clarity, UA TURBID (*)     Blood, Urine MODERATE (*)     Protein,  (*)     Leukocyte Esterase, Urine TRACE (*)     All other components within normal limits   MICROSCOPIC URINALYSIS - Abnormal; Notable for the following components:    WBC, UA 6-9 (*)     All other components within normal limits   CULTURE, BLOOD 1   CULTURE, BLOOD 2   LACTIC ACID, PLASMA       All other labs were within normal range or not returned as of this dictation.     EMERGENCY DEPARTMENT COURSE and DIFFERENTIAL DIAGNOSIS/MDM:   Vitals:    Vitals:    08/24/20 1053 08/24/20 1133 08/24/20 1237 08/24/20 1252   BP: 106/63 100/60 106/65 110/61   Pulse: 125 105 90 93   Resp: 20 19 17 17   Temp:  103.2 °F (39.6 °C)  99.7 °F (37.6 °C)   TempSrc:  Oral  Oral program.  Efforts were made to edit the dictations but occasionally words are mis-transcribed.)    Rony Patel DO (electronically signed)  Attending Emergency Physician          Rony Patel DO  08/24/20 8095

## 2020-08-24 NOTE — ED NOTES
Pt reports that he is cold and shivering Christiano Leger notified temp 97.9 oral      Lucas Jenkins RN  08/23/20 0448

## 2020-08-24 NOTE — ED NOTES
Pt resting quietly in ED cot w/no s/s of distress noted. Resp even and unlabored; will continue to monitor pt. Pt requesting lunch tray at this time; Dr. Johanna Tim advised. Cafeteria contacted for lunch tray.       Saul Murrell RN  08/24/20 3397

## 2020-08-25 ENCOUNTER — HOSPITAL ENCOUNTER (OUTPATIENT)
Dept: INFUSION THERAPY | Age: 45
Setting detail: INFUSION SERIES
End: 2020-08-25
Payer: COMMERCIAL

## 2020-08-25 LAB
ANION GAP SERPL CALCULATED.3IONS-SCNC: 8 MEQ/L (ref 9–15)
BUN BLDV-MCNC: 9 MG/DL (ref 6–20)
CALCIUM SERPL-MCNC: 8.1 MG/DL (ref 8.5–9.9)
CHLORIDE BLD-SCNC: 106 MEQ/L (ref 95–107)
CO2: 24 MEQ/L (ref 20–31)
CREAT SERPL-MCNC: 0.88 MG/DL (ref 0.7–1.2)
GFR AFRICAN AMERICAN: >60
GFR NON-AFRICAN AMERICAN: >60
GLUCOSE BLD-MCNC: 111 MG/DL (ref 70–99)
HCT VFR BLD CALC: 39.8 % (ref 42–52)
HEMOGLOBIN: 13.5 G/DL (ref 14–18)
MCH RBC QN AUTO: 32.9 PG (ref 27–31.3)
MCHC RBC AUTO-ENTMCNC: 33.8 % (ref 33–37)
MCV RBC AUTO: 97.3 FL (ref 80–100)
PDW BLD-RTO: 13.9 % (ref 11.5–14.5)
PLATELET # BLD: 87 K/UL (ref 130–400)
POTASSIUM REFLEX MAGNESIUM: 4.1 MEQ/L (ref 3.4–4.9)
RBC # BLD: 4.09 M/UL (ref 4.7–6.1)
SODIUM BLD-SCNC: 138 MEQ/L (ref 135–144)
WBC # BLD: 6.8 K/UL (ref 4.8–10.8)

## 2020-08-25 PROCEDURE — 2580000003 HC RX 258: Performed by: EMERGENCY MEDICINE

## 2020-08-25 PROCEDURE — 6370000000 HC RX 637 (ALT 250 FOR IP): Performed by: INTERNAL MEDICINE

## 2020-08-25 PROCEDURE — 6360000002 HC RX W HCPCS: Performed by: INTERNAL MEDICINE

## 2020-08-25 PROCEDURE — 85027 COMPLETE CBC AUTOMATED: CPT

## 2020-08-25 PROCEDURE — 2580000003 HC RX 258: Performed by: INTERNAL MEDICINE

## 2020-08-25 PROCEDURE — 80048 BASIC METABOLIC PNL TOTAL CA: CPT

## 2020-08-25 PROCEDURE — 1210000000 HC MED SURG R&B

## 2020-08-25 PROCEDURE — 36415 COLL VENOUS BLD VENIPUNCTURE: CPT

## 2020-08-25 PROCEDURE — 99232 SBSQ HOSP IP/OBS MODERATE 35: CPT | Performed by: INTERNAL MEDICINE

## 2020-08-25 RX ORDER — CETIRIZINE HYDROCHLORIDE 10 MG/1
10 TABLET ORAL DAILY
Status: DISCONTINUED | OUTPATIENT
Start: 2020-08-25 | End: 2020-08-26 | Stop reason: HOSPADM

## 2020-08-25 RX ADMIN — SODIUM CHLORIDE: 9 INJECTION, SOLUTION INTRAVENOUS at 10:34

## 2020-08-25 RX ADMIN — SODIUM CHLORIDE: 9 INJECTION, SOLUTION INTRAVENOUS at 22:04

## 2020-08-25 RX ADMIN — PANTOPRAZOLE SODIUM 40 MG: 40 TABLET, DELAYED RELEASE ORAL at 07:17

## 2020-08-25 RX ADMIN — Medication 10 ML: at 22:04

## 2020-08-25 RX ADMIN — CETIRIZINE HYDROCHLORIDE 10 MG: 10 TABLET, FILM COATED ORAL at 10:34

## 2020-08-25 RX ADMIN — PIPERACILLIN AND TAZOBACTAM 3.38 G: 3; .375 INJECTION, POWDER, LYOPHILIZED, FOR SOLUTION INTRAVENOUS at 09:09

## 2020-08-25 RX ADMIN — VANCOMYCIN HYDROCHLORIDE 1250 MG: 5 INJECTION, POWDER, LYOPHILIZED, FOR SOLUTION INTRAVENOUS at 10:33

## 2020-08-25 RX ADMIN — PIPERACILLIN AND TAZOBACTAM 3.38 G: 3; .375 INJECTION, POWDER, LYOPHILIZED, FOR SOLUTION INTRAVENOUS at 16:21

## 2020-08-25 RX ADMIN — SODIUM CHLORIDE: 9 INJECTION, SOLUTION INTRAVENOUS at 02:40

## 2020-08-25 RX ADMIN — VANCOMYCIN HYDROCHLORIDE 1250 MG: 5 INJECTION, POWDER, LYOPHILIZED, FOR SOLUTION INTRAVENOUS at 22:05

## 2020-08-25 RX ADMIN — ACETAMINOPHEN 650 MG: 325 TABLET, FILM COATED ORAL at 16:22

## 2020-08-25 RX ADMIN — Medication 10 ML: at 09:10

## 2020-08-25 RX ADMIN — ACETAMINOPHEN 650 MG: 325 TABLET, FILM COATED ORAL at 09:38

## 2020-08-25 ASSESSMENT — PAIN SCALES - GENERAL
PAINLEVEL_OUTOF10: 0

## 2020-08-25 ASSESSMENT — ENCOUNTER SYMPTOMS
GASTROINTESTINAL NEGATIVE: 1
ABDOMINAL DISTENTION: 0
RESPIRATORY NEGATIVE: 1

## 2020-08-25 NOTE — PROGRESS NOTES
08/25/2020     CMP:    Lab Results   Component Value Date     08/25/2020    K 4.1 08/25/2020     08/25/2020    CO2 24 08/25/2020    BUN 9 08/25/2020    CREATININE 0.88 08/25/2020    GFRAA >60.0 08/25/2020    LABGLOM >60.0 08/25/2020    GLUCOSE 111 08/25/2020    PROT 7.8 08/24/2020    LABALBU 4.3 08/24/2020    CALCIUM 8.1 08/25/2020    BILITOT 1.4 08/24/2020    ALKPHOS 78 08/24/2020    AST 24 08/24/2020    ALT 26 08/24/2020       ASSESSMENT AND PLAN      # Sepsis 2/2 E coli bacteremia  - was receiving vanco for MRSA tenosynovitis  - likely 2/2 PICC line infection- removed 8/24  - ID consulted - continue vanco/zosyn and removed PICC. Awaiting culture sensitivities for abx course and duration  - follow cultures- new cultures drawn in ED today     DVT: Lovenox    Disposition: Pt will likely continue to require IV abx. Continuing vanco/zosyn at this time. If IV abx recommended per ID, will need negative blood cultures before replacing PICC.       Lex Patten, DO  Internal Medicine

## 2020-08-25 NOTE — PROGRESS NOTES
Infectious Disease     Patient Name: Maximo Martinez  Date: 8/25/2020  YOB: 1975  Medical Record Number: [id number]    E. 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 8/4/2020  PROCEDURE:  Irrigation and debridement of the left fourth finger  abscess. Growing MRSA    Patient had missed multiple scheduled doses of his IV vancomycin  Was actually sent to the emergency room yesterday because his finger looked swollen and angry per the nursing staff in the outpatient center he was given a dose of antibiotics at the emergency room and sent back for treatment    Patient had missed a large number of doses plan was to continue at least another week of IV antibiotic therapy follow-up with me since he had also missed a follow-up appointment with me in the past week      8/24/2020 patient while receiving vancomycin developed fevers chills shortness of breath and was sent over to the emergency room  Fever was 100 heart rate was 155                Review of Systems   Constitutional: Negative for fever. Respiratory: Negative. Cardiovascular: Negative. Gastrointestinal: Negative. Negative for abdominal distention. Skin: Positive for wound. Physical Exam   Constitutional: No distress. Cardiovascular: Normal heart sounds. No murmur heard. Pulmonary/Chest: Effort normal and breath sounds normal. No respiratory distress. He has no wheezes. He has no rales. He exhibits no tenderness. Abdominal: Soft.  Bowel sounds are normal. He exhibits no distension and no mass. There is no abdominal tenderness. There is no rebound. Musculoskeletal:        Hands:    Skin: He is not diaphoretic. Blood pressure (!) 102/58, pulse 66, temperature 98.2 °F (36.8 °C), temperature source Oral, resp. rate 16, height 5' 9\" (1.753 m), weight 180 lb (81.6 kg), SpO2 98 %.       .   Lab Results   Component Value Date    WBC 6.8 08/25/2020    HGB 13.5 (L) 08/25/2020    HCT 39.8 (L) 08/25/2020    MCV 97.3 08/25/2020    PLT 87 (L) 08/25/2020     Lab Results   Component Value Date     08/25/2020    K 4.1 08/25/2020     08/25/2020    CO2 24 08/25/2020    BUN 9 08/25/2020    CREATININE 0.88 08/25/2020    GLUCOSE 111 08/25/2020    CALCIUM 8.1 08/25/2020        Culture, Blood 2 [[id number]]  (Abnormal)  Collected: 08/24/20 1039    Order Status: Completed  Specimen: Blood  Updated: 08/25/20 0513     Culture, Blood 2  --Abnormal       Gram stain aerobic bottle   Gram stain anaerobic bottle   Gram negative rods   1 out of 2 blood cultures   Further results to follow       Blood Culture, Routine Abnormal    08/23/2020  8:23 PM  [address] [facility]    Gram stain aerobic bottle   Gram stain anaerobic bottle   Gram negative rods   2 out of 2 blood cultures   Further results to follow    Organism Abnormal    08/23/2020  8:23 PM      Culture, Blood 2 Abnormal    08/23/2020  8:49 PM  [address] [facility]    Gram stain aerobic bottle   Gram stain anaerobic bottle   Gram negative rods   2 out of 2 blood cultures   Further results to follow    Organism Abnormal    08/23/2020  8:49 PM  [address] [facility]    Gram negative michael            PLAN:    E. coli sepsis  Sensitivities still pending  PICC line infection  Repeat blood cultures are positive    Fevers are down patient feels better PICC line was removed    For finger infection continue vancomycin    Zosyn for E. coli

## 2020-08-25 NOTE — PROGRESS NOTES
Shift assessment performed. Vitals stable. Patient is up independent. Patient states he had a headache this morning and was given tylenol. Antibiotics given as ordered. L ring finger is scabbed in intact without any redness. Patient denies any needs at this time. Call light within reach. Will continue to monitor.      Electronically signed by Rick Acosta RN on 8/25/2020 at 6:48 PM

## 2020-08-25 NOTE — CARE COORDINATION
IV BENEFIT REQUEST FORM    [facility]                        [address] [place]    REQUESTED BY: Electronically signed by Arash Arenas RN on 2020 at 3:17 PM                                               RN/C3: PHONE: [phone]    DATE:/TIME OF REQUEST: 20  TIME: 3:17 PM      TO: Nidia Stout TO: [phone]    PHONE: [phone]     THIS PATIENT HAS BEEN IDENTIFIED TO POSSIBLY NEED LONG TERM IV'S. PLEASE CHECK INSURANCE COVERAGE FOR THE FOLLOWING PT/DRUGS. PATIENT'S NAME: Rashaad Cotter                              [facility]   PATIENT'S : 1975  PATIENT ADDRESS:  [place] [address]     PAYOR NAME:  Payor: Markel Sevilla / Plan: Alberto Coles / Product Type: *No Product type* /     DRUG: ZOSYN                     DOSE: 3.375 GRAM             FREQUENCY: Q 8HR    DRUG: VANCOMYCIN                     DOSE: 1GRAM        FREQUENCY: Q 24HR        __________ CHECK HERE IF PT HAS NO INSURANCE AND REQUESTING SELF PAY COST. *IF [facility] IS NOT A PROVIDER FOR THIS PATIENT, PLEASE FORWARD INFO VIA FAX TO [facility] @ [phone],(PHONE NUMBER: [phone]) TO RUN BENEFIT VERIFICATION AND NOTIFY THE ABOVE C3 OF THIS PLAN. (FAX FACE SHEET WITH DEMOGRAPHICS AND INSURANCE INFO WITH THIS FORM.)  PLEASE FAX BENEFIT INFO TO: THE [phone]    This message is intended only for the use of the individual or entity to which it is addressed and may contain information that is privileged, confidential, and exempt from disclosure under applicable law. If the reader of the notice is not intended recipient of the employee/agent responsible for delivering the message to the intended recipient, you are hereby notified than any dissemination, distribution or copying of this communication is strictly prohibited.  Please contact the sender

## 2020-08-25 NOTE — PROGRESS NOTES
Deviations: None  Distance: unlimited  Comments: pt reports no difficulties      ASSESSMENT:   No Skilled PT: Independent with functional mobility     Prognosis: Good    DISCHARGE RECOMMENDATIONS:  No Skilled PT: Independent with functional mobility       Pt is independent with all functional mobility. Pt states that she has no d/c needs or concerns for safe return home. No skilled PT needs identified at this time. Please re-consult if there is a change in functional mobility status. REQUIRES PT FOLLOW UP: No        [facility] (6 CLICK) BASIC MOBILITY  AM-PAC Inpatient Mobility Raw Score : 24          Maryann Wiseman, PT, 08/25/20 at 11:20 AM         Definitions for assistance levels  Independent = pt does not require any physical supervision or assistance from another person for activity completion. Device may be needed.   Stand by assistance = pt requires verbal cues or instructions from another person, close to but not touching, to perform the activity  Minimal assistance= pt performs 75% or more of the activity; assistance is required to complete the activity  Moderate assistance= pt performs 50% of the activity; assistance is required to complete the activity  Maximal assistance = pt performs 25% of the activity; assistance is required to complete the activity  Dependent = pt requires total physical assistance to accomplish the task

## 2020-08-25 NOTE — CARE COORDINATION
SPOKE TO [facility] IN [facility], THEY HAVE TAKEN THIS PATIENT OFF OF THEIR SCHEDULE. IF DC PLAN IS TO RESUME OP [facility] IT WILL NEED SET UP AGAIN WITH NEW ORDERS. PT WAS NONCOMPLIANT AND DID NOT SHOW UP FOR APPT REGULARLY. PT CURRENTLY DOES NOT HAVE PICC. LSW/CM TO FOLLOW FOR NEEDS.

## 2020-08-26 ENCOUNTER — HOSPITAL ENCOUNTER (OUTPATIENT)
Dept: INFUSION THERAPY | Age: 45
Setting detail: INFUSION SERIES
End: 2020-08-26
Payer: COMMERCIAL

## 2020-08-26 VITALS
TEMPERATURE: 97.9 F | OXYGEN SATURATION: 97 % | BODY MASS INDEX: 26.66 KG/M2 | DIASTOLIC BLOOD PRESSURE: 66 MMHG | HEART RATE: 68 BPM | HEIGHT: 69 IN | RESPIRATION RATE: 17 BRPM | SYSTOLIC BLOOD PRESSURE: 110 MMHG | WEIGHT: 180 LBS

## 2020-08-26 LAB
ANION GAP SERPL CALCULATED.3IONS-SCNC: 8 MEQ/L (ref 9–15)
BLOOD CULTURE, ROUTINE: ABNORMAL
BUN BLDV-MCNC: 12 MG/DL (ref 6–20)
CALCIUM SERPL-MCNC: 8.6 MG/DL (ref 8.5–9.9)
CHLORIDE BLD-SCNC: 104 MEQ/L (ref 95–107)
CO2: 26 MEQ/L (ref 20–31)
CREAT SERPL-MCNC: 1.01 MG/DL (ref 0.7–1.2)
CULTURE, BLOOD 2: ABNORMAL
GFR AFRICAN AMERICAN: >60
GFR NON-AFRICAN AMERICAN: >60
GLUCOSE BLD-MCNC: 102 MG/DL (ref 70–99)
ORGANISM: ABNORMAL
POTASSIUM REFLEX MAGNESIUM: 4 MEQ/L (ref 3.4–4.9)
SODIUM BLD-SCNC: 138 MEQ/L (ref 135–144)

## 2020-08-26 PROCEDURE — 6360000002 HC RX W HCPCS: Performed by: INTERNAL MEDICINE

## 2020-08-26 PROCEDURE — 80048 BASIC METABOLIC PNL TOTAL CA: CPT

## 2020-08-26 PROCEDURE — 2580000003 HC RX 258: Performed by: EMERGENCY MEDICINE

## 2020-08-26 PROCEDURE — 36415 COLL VENOUS BLD VENIPUNCTURE: CPT

## 2020-08-26 PROCEDURE — 99232 SBSQ HOSP IP/OBS MODERATE 35: CPT | Performed by: INTERNAL MEDICINE

## 2020-08-26 PROCEDURE — 2580000003 HC RX 258: Performed by: INTERNAL MEDICINE

## 2020-08-26 PROCEDURE — 6370000000 HC RX 637 (ALT 250 FOR IP): Performed by: INTERNAL MEDICINE

## 2020-08-26 RX ORDER — CIPROFLOXACIN 500 MG/1
500 TABLET, FILM COATED ORAL 2 TIMES DAILY
Qty: 28 TABLET | Refills: 0 | Status: SHIPPED | OUTPATIENT
Start: 2020-08-26 | End: 2020-09-09

## 2020-08-26 RX ADMIN — VANCOMYCIN HYDROCHLORIDE 1250 MG: 5 INJECTION, POWDER, LYOPHILIZED, FOR SOLUTION INTRAVENOUS at 12:48

## 2020-08-26 RX ADMIN — Medication 10 ML: at 08:09

## 2020-08-26 RX ADMIN — PIPERACILLIN AND TAZOBACTAM 3.38 G: 3; .375 INJECTION, POWDER, LYOPHILIZED, FOR SOLUTION INTRAVENOUS at 08:08

## 2020-08-26 RX ADMIN — CETIRIZINE HYDROCHLORIDE 10 MG: 10 TABLET, FILM COATED ORAL at 08:09

## 2020-08-26 RX ADMIN — PIPERACILLIN AND TAZOBACTAM 3.38 G: 3; .375 INJECTION, POWDER, LYOPHILIZED, FOR SOLUTION INTRAVENOUS at 00:03

## 2020-08-26 RX ADMIN — SODIUM CHLORIDE: 9 INJECTION, SOLUTION INTRAVENOUS at 03:16

## 2020-08-26 RX ADMIN — PANTOPRAZOLE SODIUM 40 MG: 40 TABLET, DELAYED RELEASE ORAL at 05:18

## 2020-08-26 RX ADMIN — ACETAMINOPHEN 650 MG: 325 TABLET, FILM COATED ORAL at 00:53

## 2020-08-26 ASSESSMENT — PAIN SCALES - GENERAL
PAINLEVEL_OUTOF10: 0
PAINLEVEL_OUTOF10: 4

## 2020-08-26 ASSESSMENT — ENCOUNTER SYMPTOMS
GASTROINTESTINAL NEGATIVE: 1
RESPIRATORY NEGATIVE: 1
ABDOMINAL DISTENTION: 0

## 2020-08-26 NOTE — DISCHARGE SUMMARY
Physician Discharge Summary     Patient ID:  Maximo Martinez  [id number]  60 y.o.  1975    Admit date: 8/24/2020    Discharge date : 08/26/20     Admitting Physician: Mallory Duane, DO     Discharge Physician: Mallory Duane, DO     Admission Diagnoses: Bacteremia due to Escherichia coli [R78.81]    Discharge Diagnoses: E Coli bacteremia 2/2 PICC line infection    Admission Condition: fair    Discharged Condition: good    Hospital Course: Pt presented after feeling as though he was having an infusion reaction to vancomycin. Pt was having vancomycin infusions outpatient for tenosynovitis and had episodes of flushing, sob and diaphoresis. He went to the ED and they barry labs which were unremarkable and he was sent home. Went to the infusion center the next day and had the same symptoms during his vancomycin infusion so he came back to the ED. Blood cultures from the previous day had come back with E coli in his blood so patient was admitted and ID consulted. PICC was removed as likely the source of infection and patient was kept on vancomycin and zosyn to treat his MRSA tenosynovitis and E coli bacteremia. Pts finger was examined and did not appear currently infected. ID recommended PO abx on discharge and patient was discharged home on PO abx on 8/26 in stable and improved condition and will follow up with his PCP. Consults: ID      Discharge Exam:  Constitutional: Awake and alert in no acute distress.  Lying in bed comfortably  Head: Normocephalic, atraumatic  Eyes: EOMI, PERRLA  ENT: moist mucous membranes  Neck: neck supple, trachea midline  Lungs: Good inspiratory effort, CTABL, no wheeze, no rhonchi, no rales  Heart: RRR, normal S1 and S2, no murmurs  GI: Soft, non-distended, non tender, no guarding, no rebound, +BS  MSK: no edema noted  Skin: warm, dry  Psych: appropriate affect    Labs:   Recent Labs     08/23/20  1945 08/24/20  1030 08/25/20  0706   WBC 9.6 3.5* 6.8   HGB 17.1 16.6 13.5*   HCT 49.4 49.0 39.8*   * 121* 87*     Recent Labs     08/24/20  1030 08/25/20  0706 08/26/20  0733    138 138   K 4.1 4.1 4.0   CL 97 106 104   CO2 23 24 26   BUN 9 9 12   CREATININE 1.01 0.88 1.01   CALCIUM 9.1 8.1* 8.6     Recent Labs     08/23/20  1945 08/24/20  1030   AST 21 24   ALT 21 26   BILITOT 1.4* 1.4*   ALKPHOS 72 78     No results for input(s): INR in the last 72 hours. No results for input(s): Amaryllis Goodell in the last 72 hours. Urinalysis:   Lab Results   Component Value Date    NITRU Negative 08/24/2020    WBCUA 6-9 08/24/2020    BACTERIA Negative 08/24/2020    RBCUA 0-2 08/24/2020    BLOODU MODERATE 08/24/2020    SPECGRAV 1.027 08/24/2020    GLUCOSEU Negative 08/24/2020       Radiology:   Most recent    Chest CT      WITH CONTRAST:No results found for this or any previous visit. WITHOUT CONTRAST: No results found for this or any previous visit. CXR      2-view: No results found for this or any previous visit. Portable:   Results for orders placed during the hospital encounter of 08/24/20   XR CHEST PORTABLE    Narrative COMPARISON: No prior studies available for comparison. HISTORY: fever     TECHNIQUE: AP view      FINDINGS:   The lungs contain no focal infiltrate, pleural effusion or consolidation. The cardiac silhouette is within normal limits. The osseous structures are grossly intact. Impression No acute cardiopulmonary process seen. Echo No results found for this or any previous visit.     Disposition: home    In process/preliminary results:  Outstanding Order Results     Date and Time Order Name Status Description    8/24/2020 1050 Culture, Blood 1 Preliminary           Patient Instructions:   Current Discharge Medication List      START taking these medications    Details   ciprofloxacin (CIPRO) 500 MG tablet Take 1 tablet by mouth 2 times daily for 14 days  Qty: 28 tablet, Refills: 0           Activity: activity as tolerated  Diet: regular diet  Wound Care: none needed    Follow-up with Lupe Jimenez MD  in 2 weeks.     DC time 35 minutes    Signed:  Electronically signed by Arnulfo Litten, DO on 8/26/2020 at 3:21 PM

## 2020-08-26 NOTE — PROGRESS NOTES
Infectious Disease     Patient Name: Vashti Leyva  Date: 8/26/2020  YOB: 1975  Medical Record Number: [id number]    E. 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 8/4/2020  PROCEDURE:  Irrigation and debridement of the left fourth finger  abscess. Growing MRSA    Patient had missed multiple scheduled doses of his IV vancomycin  Was actually sent to the emergency room yesterday because his finger looked swollen and angry per the nursing staff in the outpatient center he was given a dose of antibiotics at the emergency room and sent back for treatment    Patient had missed a large number of doses plan was to continue at least another week of IV antibiotic therapy follow-up with me since he had also missed a follow-up appointment with me in the past week      8/24/2020 patient while receiving vancomycin developed fevers chills shortness of breath and was sent over to the emergency room  Fever was 100 heart rate was 155                Review of Systems   Constitutional: Negative for fever. Respiratory: Negative. Cardiovascular: Negative. Gastrointestinal: Negative. Negative for abdominal distention. Skin: Positive for wound. Physical Exam   Constitutional: No distress. Cardiovascular: Normal heart sounds. No murmur heard. Pulmonary/Chest: Effort normal and breath sounds normal. No respiratory distress. He has no wheezes. He has no rales. He exhibits no tenderness. Abdominal: Soft.  Bowel sounds are normal. He exhibits no distension and no mass. There is no abdominal tenderness. There is no rebound. Skin: He is not diaphoretic. Blood pressure 110/66, pulse 68, temperature 97.9 °F (36.6 °C), temperature source Oral, resp. rate 17, height 5' 9\" (1.753 m), weight 180 lb (81.6 kg), SpO2 97 %.       .   Lab Results   Component Value Date    WBC 6.8 08/25/2020    HGB 13.5 (L) 08/25/2020    HCT 39.8 (L) 08/25/2020    MCV 97.3 08/25/2020    PLT 87 (L) 08/25/2020     Lab Results   Component Value Date     08/26/2020    K 4.0 08/26/2020     08/26/2020    CO2 26 08/26/2020    BUN 12 08/26/2020    CREATININE 1.01 08/26/2020    GLUCOSE 102 08/26/2020    CALCIUM 8.6 08/26/2020        Culture, Blood 2 [[id number]]  (Abnormal)  Collected: 08/24/20 1039    Order Status: Completed  Specimen: Blood  Updated: 08/25/20 0513     Culture, Blood 2  --Abnormal       Gram stain aerobic bottle   Gram stain anaerobic bottle   Gram negative rods   1 out of 2 blood cultures   Further results to follow       Blood Culture, Routine Abnormal    08/23/2020  8:23 PM  [address] [facility]    Gram stain aerobic bottle   Gram stain anaerobic bottle   Gram negative rods   2 out of 2 blood cultures   Further results to follow    Organism Abnormal    08/23/2020  8:23 PM      Culture, Blood 2 Abnormal    08/23/2020  8:49 PM  [facility]    Gram stain aerobic bottle   Gram stain anaerobic bottle   Gram negative rods   2 out of 2 blood cultures   Further results to follow    Organism Abnormal    08/23/2020  8:49 PM  [address] [facility]    Gram negative michael      Susceptibility     Escherichia coli (2)     Antibiotic  Interpretation  TYE  Status     ampicillin  Sensitive  4  mcg/mL      ceFAZolin  Sensitive  <=4  mcg/mL      ciprofloxacin  Sensitive  <=0.25  mcg/mL      gentamicin  Sensitive  <=1  mcg/mL      trimethoprim-sulfamethoxazole  Sensitive  <=20  mcg/mL              PLAN:    E. coli

## 2020-08-26 NOTE — PROGRESS NOTES
Occupational Therapy  Facility/Department[facility] MED SURG J511/[id number]  Interdisciplinary Communication Note      To the referring provider,     While we thank you for your referral, Juan A Egan was not seen for an evaluation as: The patient refused skilled OT intervention, denying a need. Pt. up independently per chart review and pt. report. Pt. denies difficulty with ADLs or room mobility, and states he has no concerns for d/c. No acute OT needs at this time, please consider reordering if pt's status changes. Thank you,    Electronically signed by JOSE Rivera on 8/26/20 at 2:02 PM EDT    [facility] EMERGENCY [facility] AT [place] O. Department.

## 2020-08-29 LAB — BLOOD CULTURE, ROUTINE: NORMAL

## 2022-11-05 ENCOUNTER — APPOINTMENT (OUTPATIENT)
Dept: GENERAL RADIOLOGY | Age: 47
End: 2022-11-05
Payer: COMMERCIAL

## 2022-11-05 ENCOUNTER — HOSPITAL ENCOUNTER (EMERGENCY)
Age: 47
Discharge: LEFT BEFORE DISCHARGE | End: 2022-11-05
Attending: FAMILY MEDICINE
Payer: COMMERCIAL

## 2022-11-05 VITALS
HEART RATE: 84 BPM | RESPIRATION RATE: 23 BRPM | DIASTOLIC BLOOD PRESSURE: 79 MMHG | SYSTOLIC BLOOD PRESSURE: 124 MMHG | TEMPERATURE: 97.6 F | BODY MASS INDEX: 27.32 KG/M2 | WEIGHT: 185 LBS | OXYGEN SATURATION: 95 %

## 2022-11-05 DIAGNOSIS — T40.1X1A ACCIDENTAL OVERDOSE OF HEROIN, INITIAL ENCOUNTER (HCC): Primary | ICD-10-CM

## 2022-11-05 LAB
ALBUMIN SERPL-MCNC: 3.9 G/DL (ref 3.5–4.6)
ALP BLD-CCNC: 86 U/L (ref 35–104)
ALT SERPL-CCNC: 22 U/L (ref 0–41)
ANION GAP SERPL CALCULATED.3IONS-SCNC: 12 MEQ/L (ref 9–15)
AST SERPL-CCNC: 27 U/L (ref 0–40)
BASOPHILS ABSOLUTE: 0 K/UL (ref 0–0.2)
BASOPHILS RELATIVE PERCENT: 0.3 %
BILIRUB SERPL-MCNC: 1.1 MG/DL (ref 0.2–0.7)
BUN BLDV-MCNC: 11 MG/DL (ref 6–20)
CALCIUM SERPL-MCNC: 8.8 MG/DL (ref 8.5–9.9)
CHLORIDE BLD-SCNC: 105 MEQ/L (ref 95–107)
CO2: 24 MEQ/L (ref 20–31)
CREAT SERPL-MCNC: 0.85 MG/DL (ref 0.7–1.2)
EOSINOPHILS ABSOLUTE: 0 K/UL (ref 0–0.7)
EOSINOPHILS RELATIVE PERCENT: 0.1 %
GFR SERPL CREATININE-BSD FRML MDRD: >60 ML/MIN/{1.73_M2}
GLOBULIN: 2.4 G/DL (ref 2.3–3.5)
GLUCOSE BLD-MCNC: 149 MG/DL (ref 70–99)
HCT VFR BLD CALC: 39.3 % (ref 42–52)
HEMOGLOBIN: 13.3 G/DL (ref 14–18)
LYMPHOCYTES ABSOLUTE: 0.4 K/UL (ref 1–4.8)
LYMPHOCYTES RELATIVE PERCENT: 3.4 %
MCH RBC QN AUTO: 32.8 PG (ref 27–31.3)
MCHC RBC AUTO-ENTMCNC: 33.8 % (ref 33–37)
MCV RBC AUTO: 96.8 FL (ref 79–92.2)
MONOCYTES ABSOLUTE: 0.4 K/UL (ref 0.2–0.8)
MONOCYTES RELATIVE PERCENT: 3.4 %
NEUTROPHILS ABSOLUTE: 10.6 K/UL (ref 1.4–6.5)
NEUTROPHILS RELATIVE PERCENT: 92.8 %
PDW BLD-RTO: 15.1 % (ref 11.5–14.5)
PLATELET # BLD: 217 K/UL (ref 130–400)
POTASSIUM REFLEX MAGNESIUM: 3.8 MEQ/L (ref 3.4–4.9)
RBC # BLD: 4.06 M/UL (ref 4.7–6.1)
SODIUM BLD-SCNC: 141 MEQ/L (ref 135–144)
TOTAL PROTEIN: 6.3 G/DL (ref 6.3–8)
WBC # BLD: 11.5 K/UL (ref 4.8–10.8)

## 2022-11-05 PROCEDURE — 36415 COLL VENOUS BLD VENIPUNCTURE: CPT

## 2022-11-05 PROCEDURE — 85025 COMPLETE CBC W/AUTO DIFF WBC: CPT

## 2022-11-05 PROCEDURE — 80053 COMPREHEN METABOLIC PANEL: CPT

## 2022-11-05 PROCEDURE — 99285 EMERGENCY DEPT VISIT HI MDM: CPT

## 2022-11-05 PROCEDURE — 2580000003 HC RX 258: Performed by: FAMILY MEDICINE

## 2022-11-05 RX ORDER — 0.9 % SODIUM CHLORIDE 0.9 %
1000 INTRAVENOUS SOLUTION INTRAVENOUS ONCE
Status: COMPLETED | OUTPATIENT
Start: 2022-11-05 | End: 2022-11-05

## 2022-11-05 RX ADMIN — SODIUM CHLORIDE 1000 ML: 9 INJECTION, SOLUTION INTRAVENOUS at 18:14

## 2022-11-05 ASSESSMENT — PAIN - FUNCTIONAL ASSESSMENT: PAIN_FUNCTIONAL_ASSESSMENT: NONE - DENIES PAIN

## 2022-11-05 NOTE — ED NOTES
Pt gave the wrong name, , and SSN on arrival [facility] PD informed,     Chapin León RN  [phone]

## 2022-11-05 NOTE — ED NOTES
Pt states he no long wants to be seen in the ER, Dr Kailyn Gan aware, Pt to sign out of the ER against medical advise, Pt states he understands the risk of doing so up to and including death     Marisa Aragon RN  11/05/22 1907

## 2022-11-06 ASSESSMENT — ENCOUNTER SYMPTOMS
ALLERGIC/IMMUNOLOGIC NEGATIVE: 1
EYES NEGATIVE: 1
RESPIRATORY NEGATIVE: 1
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
## 2023-08-29 ENCOUNTER — APPOINTMENT (OUTPATIENT)
Dept: CT IMAGING | Age: 48
End: 2023-08-29
Payer: COMMERCIAL

## 2023-08-29 ENCOUNTER — HOSPITAL ENCOUNTER (EMERGENCY)
Age: 48
Discharge: HOME | End: 2023-08-29
Payer: COMMERCIAL

## 2023-08-29 VITALS
SYSTOLIC BLOOD PRESSURE: 138 MMHG | OXYGEN SATURATION: 99 % | DIASTOLIC BLOOD PRESSURE: 85 MMHG | RESPIRATION RATE: 18 BRPM | TEMPERATURE: 98.2 F | HEART RATE: 66 BPM

## 2023-08-29 DIAGNOSIS — T40.1X1A ACCIDENTAL OVERDOSE OF HEROIN, INITIAL ENCOUNTER (HCC): Primary | ICD-10-CM

## 2023-08-29 DIAGNOSIS — M47.22 OSTEOARTHRITIS OF SPINE WITH RADICULOPATHY, CERVICAL REGION: ICD-10-CM

## 2023-08-29 DIAGNOSIS — R20.2 PARESTHESIA: ICD-10-CM

## 2023-08-29 PROCEDURE — 6370000000 HC RX 637 (ALT 250 FOR IP): Performed by: PHYSICIAN ASSISTANT

## 2023-08-29 PROCEDURE — 70450 CT HEAD/BRAIN W/O DYE: CPT

## 2023-08-29 PROCEDURE — 99284 EMERGENCY DEPT VISIT MOD MDM: CPT

## 2023-08-29 PROCEDURE — 72125 CT NECK SPINE W/O DYE: CPT

## 2023-08-29 RX ORDER — PREDNISONE 20 MG/1
40 TABLET ORAL ONCE
Status: COMPLETED | OUTPATIENT
Start: 2023-08-29 | End: 2023-08-29

## 2023-08-29 RX ORDER — PREDNISONE 10 MG/1
40 TABLET ORAL DAILY
Qty: 20 TABLET | Refills: 0 | Status: SHIPPED | OUTPATIENT
Start: 2023-08-29 | End: 2023-09-03

## 2023-08-29 RX ADMIN — PREDNISONE 40 MG: 20 TABLET ORAL at 20:11

## 2023-08-29 ASSESSMENT — ENCOUNTER SYMPTOMS
ABDOMINAL DISTENTION: 0
APNEA: 0
COUGH: 0
VOICE CHANGE: 0
NAUSEA: 0
EYE DISCHARGE: 0
VOMITING: 0
ANAL BLEEDING: 0

## 2023-08-29 ASSESSMENT — PAIN - FUNCTIONAL ASSESSMENT
PAIN_FUNCTIONAL_ASSESSMENT: NONE - DENIES PAIN
PAIN_FUNCTIONAL_ASSESSMENT: NONE - DENIES PAIN

## 2023-08-29 NOTE — ED NOTES
Patient is resting with his eyes closed at this time PD at the door     Lynette Gomezigham [place]  08/29/23 1941
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
## 2023-08-29 NOTE — ED NOTES
Pt. Refusing imaging until evaluation per provider. Celia Isabel, Primary nurse advised. Tico PETERS remains at bedside.      Lavelle Raymundo RN  08/29/23 8942

## 2023-08-29 NOTE — ED NOTES
Pt cooperative at this time   Reported to this nurse that pt was previously no cooperative  Pt awoken at this time   Pt alert and oriented times 4  Pt has [employer]  at bedside        Ally Ramires  08/29/23 Adelaide Rangel RN  08/29/23 2007
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
## 2023-08-30 NOTE — ED NOTES
Pt cooperative at this time   Pt to be discharged at this time to police custody     Ray Bejarano RN  08/29/23 2013

## 2023-09-13 NOTE — CONSULTS
Infectious Disease     Patient Name: Bert Sagastume  Date: 8/24/2020  YOB: 1975  Medical Record Number: [id number]    E. 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 8/4/2020  PROCEDURE:  Irrigation and debridement of the left fourth finger  abscess.   Growing MRSA    Patient had missed multiple scheduled doses of his IV vancomycin  Was actually sent to the emergency room yesterday because his finger looked swollen and angry per the nursing staff in the outpatient center he was given a dose of antibiotics at the emergency room and sent back for treatment    Patient had missed a large number of doses plan was to continue at least another week of IV antibiotic therapy follow-up with me since he had also missed a follow-up appointment with me in the past week      8/24/2020 patient while receiving vancomycin developed fevers chills shortness of breath and was sent over to the emergency room  Fever was 100 heart rate was 155      Valuated at the emergency room on 8/23/2020 he had similar symptoms as today with chills not feeling well blood cultures x2 have grown E. coli from that admission  Component  Collected  Lab    Blood Culture, Routine Abnormal    08/23/2020  8:23 PM  [address] [facility]    Gram stain aerobic bottle   Gram stain anaerobic bottle   Gram negative rods   2 out of 2 blood cultures   Further results to follow    Organism Abnormal    08/23/2020  8:23 PM  [address] [facility]    Escherichia coli DNA Detected                Review of Systems   Constitutional: Negative for chills, diaphoresis, fatigue and fever. HENT: Negative. Eyes: Negative. Respiratory: Positive for shortness of breath. Negative for cough, choking, chest tightness and wheezing. Cardiovascular: Negative. Gastrointestinal: Positive for nausea. Negative for abdominal distention, abdominal pain, constipation, diarrhea and vomiting. Endocrine: Negative. Musculoskeletal: Negative. Skin: Positive for wound. Neurological: Positive for weakness. Review of Systems: All 14 review of systems negative other than as stated above    Social History     Tobacco Use    Smoking status: Current Every Day Smoker     Packs/day: 1.00     Years: 20.00     Pack years: 20.00    Smokeless tobacco: Never Used   Substance Use Topics    Alcohol use: Yes    Drug use: No         Past Medical History:   Diagnosis Date    Cancer ([facility] [address]     history of testicular            Past Surgical History:   Procedure Laterality Date    BACK SURGERY      HAND SURGERY Left 8/4/2020    WASHOUT I&D LEFT RING FINGER ROOM performed by Preston Barth MD at [address] [place] [address]           No current facility-administered medications on file prior to encounter. No current outpatient medications on file prior to encounter. No Known Allergies      History reviewed. No pertinent family history. Physical Exam:      Physical Exam   Constitutional: He is oriented to person, place, and time. He appears distressed. HENT:   Head: Normocephalic. Eyes: Pupils are equal, round, and reactive to light. Neck: Normal range of motion. No JVD present. No tracheal deviation present. No thyromegaly present. Cardiovascular: Normal heart sounds. No murmur heard. Pulmonary/Chest: Effort normal and breath sounds normal. No respiratory distress. He has no wheezes. He has no rales. He exhibits no tenderness. Abdominal: Soft. Bowel sounds are normal. He exhibits no distension and no mass. There is no abdominal tenderness. There is no rebound and no guarding. Musculoskeletal:         General: No edema. Hands:    Lymphadenopathy:     He has no cervical adenopathy. Neurological: He is alert and oriented to person, place, and time. Skin: Skin is warm. He is diaphoretic. No erythema. Blood pressure (!) 86/53, pulse 72, temperature 97.9 °F (36.6 °C), temperature source Oral, resp. rate 18, height 5' 9\" (1.753 m), weight 180 lb (81.6 kg), SpO2 98 %.       .   Lab Results   Component Value Date    WBC 3.5 (L) 08/24/2020    HGB 16.6 08/24/2020    HCT 49.0 08/24/2020    MCV 96.2 08/24/2020     (L) 08/24/2020     Lab Results   Component Value Date     08/24/2020    K 4.1 08/24/2020    CL 97 08/24/2020    CO2 23 08/24/2020    BUN 9 08/24/2020    CREATININE 1.01 08/24/2020    GLUCOSE 93 08/24/2020    CALCIUM 9.1 08/24/2020                ASSESSMENT:  Patient Active Problem List   Diagnosis    Tenosynovitis of finger    Bacteremia due to Escherichia coli         PLAN:    E. coli sepsis    PICC line infection    Remove PICC line    Continue vancomycin  Zosyn for E. coli You can access the FollowMyHealth Patient Portal offered by [facility] by registering at the following website: [email or web address]. By joining [facility]’s FollowMyHealth portal, you will also be able to view your health information using other applications (apps) compatible with our system.

## 2024-05-30 ENCOUNTER — HOSPITAL ENCOUNTER (EMERGENCY)
Age: 49
Discharge: HOME | End: 2024-05-30
Payer: COMMERCIAL

## 2024-05-30 ENCOUNTER — APPOINTMENT (OUTPATIENT)
Dept: GENERAL RADIOLOGY | Age: 49
End: 2024-05-30
Payer: COMMERCIAL

## 2024-05-30 ENCOUNTER — APPOINTMENT (OUTPATIENT)
Dept: CT IMAGING | Age: 49
End: 2024-05-30
Payer: COMMERCIAL

## 2024-05-30 VITALS
DIASTOLIC BLOOD PRESSURE: 78 MMHG | RESPIRATION RATE: 18 BRPM | HEIGHT: 69 IN | TEMPERATURE: 98.2 F | SYSTOLIC BLOOD PRESSURE: 133 MMHG | BODY MASS INDEX: 25.18 KG/M2 | OXYGEN SATURATION: 98 % | WEIGHT: 170 LBS | HEART RATE: 83 BPM

## 2024-05-30 DIAGNOSIS — S81.012A LACERATION OF LEFT KNEE, INITIAL ENCOUNTER: Primary | ICD-10-CM

## 2024-05-30 DIAGNOSIS — Z23 NEED FOR TDAP VACCINATION: ICD-10-CM

## 2024-05-30 PROCEDURE — 6360000002 HC RX W HCPCS: Performed by: NURSE PRACTITIONER

## 2024-05-30 PROCEDURE — 96374 THER/PROPH/DIAG INJ IV PUSH: CPT

## 2024-05-30 PROCEDURE — 6360000004 HC RX CONTRAST MEDICATION: Performed by: NURSE PRACTITIONER

## 2024-05-30 PROCEDURE — 2580000003 HC RX 258: Performed by: NURSE PRACTITIONER

## 2024-05-30 PROCEDURE — 96375 TX/PRO/DX INJ NEW DRUG ADDON: CPT

## 2024-05-30 PROCEDURE — 12034 INTMD RPR S/TR/EXT 7.6-12.5: CPT

## 2024-05-30 PROCEDURE — 90471 IMMUNIZATION ADMIN: CPT | Performed by: NURSE PRACTITIONER

## 2024-05-30 PROCEDURE — 2500000003 HC RX 250 WO HCPCS: Performed by: NURSE PRACTITIONER

## 2024-05-30 PROCEDURE — 90715 TDAP VACCINE 7 YRS/> IM: CPT | Performed by: NURSE PRACTITIONER

## 2024-05-30 PROCEDURE — A4217 STERILE WATER/SALINE, 500 ML: HCPCS | Performed by: NURSE PRACTITIONER

## 2024-05-30 PROCEDURE — 73701 CT LOWER EXTREMITY W/DYE: CPT

## 2024-05-30 PROCEDURE — 99285 EMERGENCY DEPT VISIT HI MDM: CPT

## 2024-05-30 PROCEDURE — 73560 X-RAY EXAM OF KNEE 1 OR 2: CPT

## 2024-05-30 PROCEDURE — 6370000000 HC RX 637 (ALT 250 FOR IP): Performed by: NURSE PRACTITIONER

## 2024-05-30 RX ORDER — CEPHALEXIN 500 MG/1
1000 CAPSULE ORAL 2 TIMES DAILY
Qty: 40 CAPSULE | Refills: 0 | Status: SHIPPED | OUTPATIENT
Start: 2024-05-30

## 2024-05-30 RX ORDER — HYDROCODONE BITARTRATE AND ACETAMINOPHEN 5; 325 MG/1; MG/1
1 TABLET ORAL EVERY 6 HOURS PRN
Qty: 10 TABLET | Refills: 0 | Status: SHIPPED | OUTPATIENT
Start: 2024-05-30 | End: 2024-06-02

## 2024-05-30 RX ORDER — ONDANSETRON 2 MG/ML
4 INJECTION INTRAMUSCULAR; INTRAVENOUS ONCE
Status: COMPLETED | OUTPATIENT
Start: 2024-05-30 | End: 2024-05-30

## 2024-05-30 RX ORDER — LIDOCAINE HYDROCHLORIDE 20 MG/ML
5 INJECTION, SOLUTION INFILTRATION; PERINEURAL ONCE
Status: COMPLETED | OUTPATIENT
Start: 2024-05-30 | End: 2024-05-30

## 2024-05-30 RX ORDER — MORPHINE SULFATE 4 MG/ML
4 INJECTION, SOLUTION INTRAMUSCULAR; INTRAVENOUS ONCE
Status: COMPLETED | OUTPATIENT
Start: 2024-05-30 | End: 2024-05-30

## 2024-05-30 RX ORDER — MAGNESIUM HYDROXIDE 1200 MG/15ML
1000 LIQUID ORAL ONCE
Status: COMPLETED | OUTPATIENT
Start: 2024-05-30 | End: 2024-05-30

## 2024-05-30 RX ORDER — HYDROXYZINE PAMOATE 25 MG/1
25 CAPSULE ORAL ONCE
Status: COMPLETED | OUTPATIENT
Start: 2024-05-30 | End: 2024-05-30

## 2024-05-30 RX ADMIN — IOPAMIDOL 75 ML: 755 INJECTION, SOLUTION INTRAVENOUS at 14:18

## 2024-05-30 RX ADMIN — SODIUM CHLORIDE 1000 ML: 900 IRRIGANT IRRIGATION at 13:11

## 2024-05-30 RX ADMIN — LIDOCAINE HYDROCHLORIDE 5 ML: 20 INJECTION, SOLUTION INFILTRATION; PERINEURAL at 13:11

## 2024-05-30 RX ADMIN — ONDANSETRON 4 MG: 2 INJECTION INTRAMUSCULAR; INTRAVENOUS at 13:05

## 2024-05-30 RX ADMIN — MORPHINE SULFATE 4 MG: 4 INJECTION, SOLUTION INTRAMUSCULAR; INTRAVENOUS at 13:07

## 2024-05-30 RX ADMIN — HYDROXYZINE PAMOATE 25 MG: 25 CAPSULE ORAL at 16:51

## 2024-05-30 RX ADMIN — TETANUS TOXOID, REDUCED DIPHTHERIA TOXOID AND ACELLULAR PERTUSSIS VACCINE, ADSORBED 0.5 ML: 5; 2.5; 8; 8; 2.5 SUSPENSION INTRAMUSCULAR at 13:08

## 2024-05-30 ASSESSMENT — ENCOUNTER SYMPTOMS
COUGH: 0
ABDOMINAL PAIN: 0
BACK PAIN: 0
SHORTNESS OF BREATH: 0

## 2024-05-30 ASSESSMENT — PAIN SCALES - GENERAL: PAINLEVEL_OUTOF10: 7

## 2024-05-30 ASSESSMENT — PAIN DESCRIPTION - DESCRIPTORS: DESCRIPTORS: THROBBING

## 2024-05-30 ASSESSMENT — PAIN DESCRIPTION - ORIENTATION: ORIENTATION: RIGHT

## 2024-05-30 ASSESSMENT — PAIN DESCRIPTION - LOCATION: LOCATION: KNEE

## 2024-05-30 ASSESSMENT — PAIN - FUNCTIONAL ASSESSMENT
PAIN_FUNCTIONAL_ASSESSMENT: NONE - DENIES PAIN
PAIN_FUNCTIONAL_ASSESSMENT: NONE - DENIES PAIN

## 2024-05-30 ASSESSMENT — LIFESTYLE VARIABLES
HOW OFTEN DO YOU HAVE A DRINK CONTAINING ALCOHOL: NEVER
HOW MANY STANDARD DRINKS CONTAINING ALCOHOL DO YOU HAVE ON A TYPICAL DAY: PATIENT DOES NOT DRINK

## 2024-05-30 NOTE — CARE COORDINATION
Pt states currently no ins. I was asked to MAP antibiotic. Rx sent to [facility] [address]     Electronically signed by Hanna Garzon RN, BSN on 5/30/2024 at 4:57 PM

## 2024-05-30 NOTE — ED PROVIDER NOTES
[facility] ED  eMERGENCY dEPARTMENT eNCOUnter      Pt Name: Jonny Limon  MRN: [id number]  Birthdate 1975  Date of evaluation: 5/30/2024  Provider: ELADIA Centeno CNP      HISTORY OF PRESENT ILLNESS    Jonny Limon is a 48 y.o. male who presents to the Emergency Department with L leg laceration that occurred with a chain saw PTA.  Pain is moderate.  Patient does have FROM of leg and had been ambulating on the leg.          REVIEW OF SYSTEMS       Review of Systems   Constitutional:  Negative for fever.   HENT:  Negative for congestion.    Respiratory:  Negative for cough and shortness of breath.    Cardiovascular:  Negative for chest pain.   Gastrointestinal:  Negative for abdominal pain.   Genitourinary:  Negative for dysuria.   Musculoskeletal:  Negative for arthralgias and back pain.   Skin:  Positive for wound (L knee laceration). Negative for rash.   All other systems reviewed and are negative.        PAST MEDICAL HISTORY     Past Medical History:   Diagnosis Date    Cancer (HCC)     history of testicular     IV drug abuse (HCC)          SURGICAL HISTORY       Past Surgical History:   Procedure Laterality Date    BACK SURGERY      HAND SURGERY Left 8/4/2020    WASHOUT I&D LEFT RING FINGER ROOM performed by Rayshawn Butt MD at [facility] [place]    KNEE SURGERY      SKULL FRACTURE ELEVATION           CURRENT MEDICATIONS       Previous Medications    No medications on file       ALLERGIES     Patient has no active allergies.    FAMILY HISTORY     History reviewed. No pertinent family history.       SOCIAL HISTORY       Social History     Socioeconomic History    Marital status:      Spouse name: None    Number of children: None    Years of education: None    Highest education level: None   Tobacco Use    Smoking status: Every Day     Current packs/day: 1.00     Average packs/day: 1 pack/day for 20.0 years (20.0 ttl pk-yrs)     Types: Cigarettes    Smokeless tobacco:

## 2024-05-30 NOTE — ED NOTES
Pt stable, a&ox4, skin w/d/pink, sterile non-adherent dressing, gauze roll and ace wrap applied to pt's l knee, pt radha. Well.   Pt demonstrates use of crutches, radha. Well. Pt out from ed via w/c, going home with family, 0 problems, 0 c/o.

## 2024-05-31 LAB
PERFORMED ON: NORMAL
POC CREATININE: 1.1 MG/DL (ref 0.8–1.3)
POC SAMPLE TYPE: NORMAL

## 2024-06-12 ENCOUNTER — HOSPITAL ENCOUNTER (EMERGENCY)
Age: 49
Discharge: HOME | End: 2024-06-12
Payer: COMMERCIAL

## 2024-06-12 VITALS
RESPIRATION RATE: 15 BRPM | DIASTOLIC BLOOD PRESSURE: 86 MMHG | SYSTOLIC BLOOD PRESSURE: 133 MMHG | TEMPERATURE: 98.3 F | HEIGHT: 69 IN | WEIGHT: 170 LBS | OXYGEN SATURATION: 99 % | BODY MASS INDEX: 25.18 KG/M2 | HEART RATE: 82 BPM

## 2024-06-12 DIAGNOSIS — Z48.02 VISIT FOR SUTURE REMOVAL: Primary | ICD-10-CM

## 2024-06-12 PROCEDURE — 99283 EMERGENCY DEPT VISIT LOW MDM: CPT

## 2024-06-12 PROCEDURE — 6370000000 HC RX 637 (ALT 250 FOR IP)

## 2024-06-12 RX ORDER — GINSENG 100 MG
CAPSULE ORAL ONCE
Status: COMPLETED | OUTPATIENT
Start: 2024-06-12 | End: 2024-06-12

## 2024-06-12 RX ORDER — BACITRACIN ZINC AND POLYMYXIN B SULFATE 500; 1000 [USP'U]/G; [USP'U]/G
OINTMENT TOPICAL
Qty: 28.4 G | Refills: 0 | Status: SHIPPED | OUTPATIENT
Start: 2024-06-12 | End: 2024-06-19

## 2024-06-12 RX ADMIN — BACITRACIN: 500 OINTMENT TOPICAL at 10:42

## 2024-06-12 ASSESSMENT — PAIN - FUNCTIONAL ASSESSMENT: PAIN_FUNCTIONAL_ASSESSMENT: NONE - DENIES PAIN

## 2024-06-12 NOTE — DISCHARGE INSTRUCTIONS
Apply medication as directed.    Keep wound clean, dry.    Follow-up with PCP as needed.    Return to ED if any new, or worsening symptoms.

## 2024-06-12 NOTE — ED PROVIDER NOTES
[facility] ED  EMERGENCY DEPARTMENT ENCOUNTER      Pt Name: Jonny Limon  MRN: [id number]  Birthdate 1975  Date of evaluation: 6/12/2024  Provider: TANISHA Rebolledo  10:23 AM EDT    CHIEF COMPLAINT       Chief Complaint   Patient presents with    Suture / Staple Removal     Left knee          HISTORY OF PRESENT ILLNESS   (Location/Symptom, Timing/Onset, Context/Setting, Quality, Duration, Modifying Factors, Severity)  Note limiting factors.   Jonny Limon is a 48 y.o. male whom per chart review has a PMHx of IV drug abuse, Testicular CA presents to ED for suture removal. Patient reports that he was evaluated in the Emergency Department on 05/30/2024 following a chainsaw injury to his L knee.  Patient verbalizes no additional complaints on arrival to the emergency department.    HPI    Nursing Notes were reviewed.    REVIEW OF SYSTEMS    (2-9 systems for level 4, 10 or more for level 5)     Review of Systems   Skin:  Positive for wound.   All other systems reviewed and are negative.      Except as noted above the remainder of the review of systems was reviewed and negative.       PAST MEDICAL HISTORY     Past Medical History:   Diagnosis Date    Cancer (HCC)     history of testicular     IV drug abuse (HCC)          SURGICAL HISTORY       Past Surgical History:   Procedure Laterality Date    BACK SURGERY      HAND SURGERY Left 8/4/2020    WASHOUT I&D LEFT RING FINGER ROOM performed by Rayshawn Butt MD at [facility] [place]    KNEE SURGERY      SKULL FRACTURE ELEVATION           CURRENT MEDICATIONS       Previous Medications    CEPHALEXIN (KEFLEX) 500 MG CAPSULE    Take 2 capsules by mouth 2 times daily       ALLERGIES     Patient has no known allergies.    FAMILY HISTORY     No family history on file.       SOCIAL HISTORY       Social History     Socioeconomic History    Marital status:    Tobacco Use    Smoking status: Every Day     Current packs/day: 1.00     Average

## (undated) DEVICE — ELECTRODE PT RET AD L9FT HI MOIST COND ADH HYDRGEL CORDED

## (undated) DEVICE — PAD,ABDOMINAL,8"X10",ST,LF: Brand: MEDLINE

## (undated) DEVICE — COVER LT HNDL BLU PLAS

## (undated) DEVICE — PADDING CAST N ADH 4 YDX3 IN HIGHLY ABSORBENT EZ APPL SOFROL

## (undated) DEVICE — HAND II: Brand: MEDLINE INDUSTRIES, INC.

## (undated) DEVICE — GOWN,SIRUS,POLYRNF,BRTHSLV,XLN/XL,20/CS: Brand: MEDLINE

## (undated) DEVICE — SYRINGE IRRIG 60ML SFT PLIABLE BLB EZ TO GRP 1 HND USE W/

## (undated) DEVICE — BANDAGE COMPR W3INXL5YD BGE POLY COT E RECOVERABLE BRTH W/

## (undated) DEVICE — 4-PORT MANIFOLD: Brand: NEPTUNE 2

## (undated) DEVICE — GLOVE ORANGE PI 7   MSG9070

## (undated) DEVICE — SPONGE GZ W4XL4IN COT 12 PLY TYP VII WVN C FLD DSGN

## (undated) DEVICE — SUTURE ETHLN SZ 4-0 L18IN NONABSORBABLE BLK L19MM PS-2 3/8 1667H

## (undated) DEVICE — APPLICATOR MEDICATED 26 CC SOLUTION HI LT ORNG CHLORAPREP

## (undated) DEVICE — GLOVE ORANGE PI 7 1/2   MSG9075

## (undated) DEVICE — TUBING, SUCTION, 1/4" X 10', STRAIGHT: Brand: MEDLINE

## (undated) DEVICE — 1010 S-DRAPE TOWEL DRAPE 10/BX: Brand: STERI-DRAPE™

## (undated) DEVICE — DRESSING GZ W1XL8IN COT XRFRM N ADH OVERWRAP CURAD

## (undated) DEVICE — ZIMMER® STERILE DISPOSABLE TOURNIQUET CUFF, DUAL PORT, SINGLE BLADDER, 18 IN. (46 CM)